# Patient Record
Sex: MALE | Race: WHITE
[De-identification: names, ages, dates, MRNs, and addresses within clinical notes are randomized per-mention and may not be internally consistent; named-entity substitution may affect disease eponyms.]

---

## 2017-01-12 ENCOUNTER — HOSPITAL ENCOUNTER (EMERGENCY)
Dept: HOSPITAL 47 - EC | Age: 52
Discharge: HOME | End: 2017-01-12
Payer: COMMERCIAL

## 2017-01-12 VITALS — TEMPERATURE: 98.4 F

## 2017-01-12 VITALS — RESPIRATION RATE: 18 BRPM

## 2017-01-12 VITALS — HEART RATE: 88 BPM | SYSTOLIC BLOOD PRESSURE: 153 MMHG | DIASTOLIC BLOOD PRESSURE: 97 MMHG

## 2017-01-12 DIAGNOSIS — Z87.891: ICD-10-CM

## 2017-01-12 DIAGNOSIS — Z82.49: ICD-10-CM

## 2017-01-12 DIAGNOSIS — R07.9: Primary | ICD-10-CM

## 2017-01-12 DIAGNOSIS — Z86.711: ICD-10-CM

## 2017-01-12 DIAGNOSIS — F41.9: ICD-10-CM

## 2017-01-12 DIAGNOSIS — Z79.01: ICD-10-CM

## 2017-01-12 DIAGNOSIS — Z79.899: ICD-10-CM

## 2017-01-12 LAB
ALP SERPL-CCNC: 69 U/L (ref 38–126)
ALT SERPL-CCNC: 101 U/L (ref 21–72)
ANION GAP SERPL CALC-SCNC: 16 MMOL/L
AST SERPL-CCNC: 43 U/L (ref 17–59)
BASOPHILS # BLD AUTO: 0.1 K/UL (ref 0–0.2)
BASOPHILS NFR BLD AUTO: 1 %
BUN SERPL-SCNC: 16 MG/DL (ref 9–20)
CALCIUM SPEC-MCNC: 9.6 MG/DL (ref 8.4–10.2)
CH: 31
CHCM: 34.9
CHLORIDE SERPL-SCNC: 106 MMOL/L (ref 98–107)
CO2 SERPL-SCNC: 22 MMOL/L (ref 22–30)
EOSINOPHIL # BLD AUTO: 0.1 K/UL (ref 0–0.7)
EOSINOPHIL NFR BLD AUTO: 2 %
ERYTHROCYTE [DISTWIDTH] IN BLOOD BY AUTOMATED COUNT: 5.29 M/UL (ref 4.3–5.9)
ERYTHROCYTE [DISTWIDTH] IN BLOOD: 12.9 % (ref 11.5–15.5)
GLUCOSE SERPL-MCNC: 170 MG/DL (ref 74–99)
HCT VFR BLD AUTO: 47.3 % (ref 39–53)
HDW: 2.67
HGB BLD-MCNC: 16 GM/DL (ref 13–17.5)
LUC NFR BLD AUTO: 2 %
LYMPHOCYTES # SPEC AUTO: 1.6 K/UL (ref 1–4.8)
LYMPHOCYTES NFR SPEC AUTO: 23 %
MCH RBC QN AUTO: 30.2 PG (ref 25–35)
MCHC RBC AUTO-ENTMCNC: 33.8 G/DL (ref 31–37)
MCV RBC AUTO: 89.3 FL (ref 80–100)
MONOCYTES # BLD AUTO: 0.5 K/UL (ref 0–1)
MONOCYTES NFR BLD AUTO: 7 %
NEUTROPHILS # BLD AUTO: 4.6 K/UL (ref 1.3–7.7)
NEUTROPHILS NFR BLD AUTO: 65 %
NON-AFRICAN AMERICAN GFR(MDRD): >60
POTASSIUM SERPL-SCNC: 4.1 MMOL/L (ref 3.5–5.1)
PROT SERPL-MCNC: 7.7 G/DL (ref 6.3–8.2)
SODIUM SERPL-SCNC: 144 MMOL/L (ref 137–145)
WBC # BLD AUTO: 0.14 10*3/UL
WBC # BLD AUTO: 7 K/UL (ref 3.8–10.6)
WBC (PEROX): 6.78

## 2017-01-12 PROCEDURE — 93005 ELECTROCARDIOGRAM TRACING: CPT

## 2017-01-12 PROCEDURE — 85025 COMPLETE CBC W/AUTO DIFF WBC: CPT

## 2017-01-12 PROCEDURE — 99285 EMERGENCY DEPT VISIT HI MDM: CPT

## 2017-01-12 PROCEDURE — 83880 ASSAY OF NATRIURETIC PEPTIDE: CPT

## 2017-01-12 PROCEDURE — 71010: CPT

## 2017-01-12 PROCEDURE — 36415 COLL VENOUS BLD VENIPUNCTURE: CPT

## 2017-01-12 PROCEDURE — 84484 ASSAY OF TROPONIN QUANT: CPT

## 2017-01-12 PROCEDURE — 85379 FIBRIN DEGRADATION QUANT: CPT

## 2017-01-12 PROCEDURE — 80053 COMPREHEN METABOLIC PANEL: CPT

## 2017-01-12 NOTE — XR
EXAMINATION TYPE: XR chest 1V portable

 

DATE OF EXAM: 1/12/2017 9:14 AM

 

Comparison: None

 

Clinical History: 51-year-old male with dyspnea and chest pain today

 

Findings:

 The cardiomediastinal silhouette, aorta, and pulmonary vasculature are within normal limits. Lung vo
lumes are slightly low with crowded vascular markings. No consolidation or pleural effusion. 

 

Impression:

Hypoventilatory changes without acute cardiopulmonary process.

## 2017-01-12 NOTE — ED
SOB HPI





- General


Chief Complaint: Shortness of Breath


Stated Complaint: RECENT Hx OF PE, SHOWING SYMPTOMS AGAIN, SOB


Time Seen by Provider: 01/12/17 08:37


Source: patient, RN notes reviewed


Limitations: no limitations





- History of Present Illness


Initial Comments: 





This patient is a 51-year-old man with a history of pulmonary embolus diagnosed 

in May 2016, who states that this morning when he was getting ready for work he 

noticed he was having some mild shortness of breath, as well as a tight feeling 

across his chest.  He states that after a minute he noticed that he was having 

a tingling feeling in his fingers and toes both sides.  He states that given 

the history of PE he decided he should be evaluated for this.  The patient 

states that his previous course did not reveal a cause for the pulmonary 

embolus.  His father did have history of DVT.  The patient does continue to 

take Xarelto as prescribed, and states he hasn't missed doses.  Patient denies 

prolonged travel or bed rest.  No leg pain or swelling.


MD Complaint: shortness of breath, chest pain


-: hour(s)


Severity: mild


Quality: dull


Consistency: constant


Improves With: nothing


Worsens With: nothing


Known History Of: other (PE)


Associated Symptoms: parasthesias





- Related Data


 Home Medications











 Medication  Instructions  Recorded  Confirmed


 


Gemfibrozil [Lopid] 600 mg PO BID 01/12/17 01/12/17


 


Metoprolol Tartrate [Lopressor] 12.5 mg PO BID 01/12/17 01/12/17








 Previous Rx's











 Medication  Instructions  Recorded


 


Rivaroxaban [Xarelto] 20 mg PO DAILY #30 tab 06/01/16











 Allergies











Allergy/AdvReac Type Severity Reaction Status Date / Time


 


No Known Allergies Allergy   Verified 01/12/17 09:07














Review of Systems


ROS Statement: 


Those systems with pertinent positive or pertinent negative responses have been 

documented in the HPI.





ROS Other: All systems not noted in ROS Statement are negative.


Constitutional: Denies: fever, chills


ENT: Denies: throat pain, congestion


Respiratory: Reports: as per HPI, dyspnea.  Denies: cough, wheezes, hemoptysis


Cardiovascular: Reports: as per HPI, chest pain.  Denies: palpitations, dyspnea 

on exertion, orthopnea, edema, syncope


Gastrointestinal: Denies: abdominal pain, nausea, vomiting


Genitourinary: Denies: dysuria, hematuria


Musculoskeletal: Denies: back pain


Skin: Denies: rash


Neurological: Denies: headache, weakness, numbness





Past Medical History


Past Medical History: No Reported History, Pneumonia


History of Any Multi-Drug Resistant Organisms: None Reported


Past Surgical History: No Surgical Hx Reported


Past Psychological History: No Psychological Hx Reported


Smoking Status: Never smoker


Past Alcohol Use History: Occasional


Past Drug Use History: None Reported


Additional Drug Use History / Comment(s): Patient quit tobacco use use over 10 

years ago occasionally he would smoke cigars patient denies any social drug use 

occasionally drinks alcoholic beverages there is no O2 requirements at home no 

nebulizer needs no CPAP needs patient lives with the parent and is single





- Past Family History


  ** Mother


Family Medical History: No Reported History, Coronary Artery Disease (CAD)


Additional Family Medical History / Comment(s): CABG in 70's, father healthy, 

one brother healthy no sisters no.daughter has no sons





General Exam


General appearance: alert, in no apparent distress


Head exam: Present: atraumatic, normocephalic


Eye exam: Present: normal appearance.  Absent: scleral icterus, conjunctival 

injection


ENT exam: Present: normal oropharynx


Neck exam: Present: normal inspection


Respiratory exam: Present: normal lung sounds bilaterally.  Absent: respiratory 

distress, wheezes, rales, rhonchi, stridor, chest wall tenderness, decreased 

breath sounds, prolonged expiratory


Cardiovascular Exam: Present: regular rate, normal rhythm, normal heart sounds.

  Absent: systolic murmur, diastolic murmur, rubs, gallop


GI/Abdominal exam: Present: soft.  Absent: distended, tenderness, guarding, 

rebound


Extremities exam: Present: normal inspection, normal capillary refill.  Absent: 

pedal edema, calf tenderness


Back exam: Present: normal inspection.  Absent: CVA tenderness (R), CVA 

tenderness (L)


Neurological exam: Present: alert


Skin exam: Present: warm, dry, intact, normal color.  Absent: rash, cyanosis, 

diaphoretic, erythema, petechiae, pallor, mottled





Course


 Vital Signs











  01/12/17 01/12/17





  08:30 10:30


 


Temperature 98.4 F 


 


Pulse Rate 89 87


 


Respiratory 22 18





Rate  


 


Blood Pressure 133/90 125/85


 


O2 Sat by Pulse 95 95





Oximetry  














Medical Decision Making





- Lab Data


Result diagrams: 


 01/12/17 08:35





 01/12/17 08:35


 Lab Results











  01/12/17 01/12/17 01/12/17 Range/Units





  08:35 08:35 08:35 


 


WBC  7.0    (3.8-10.6)  k/uL


 


RBC  5.29    (4.30-5.90)  m/uL


 


Hgb  16.0    (13.0-17.5)  gm/dL


 


Hct  47.3    (39.0-53.0)  %


 


MCV  89.3    (80.0-100.0)  fL


 


MCH  30.2    (25.0-35.0)  pg


 


MCHC  33.8    (31.0-37.0)  g/dL


 


RDW  12.9    (11.5-15.5)  %


 


Plt Count  304    (150-450)  k/uL


 


Neutrophils %  65    %


 


Lymphocytes %  23    %


 


Monocytes %  7    %


 


Eosinophils %  2    %


 


Basophils %  1    %


 


Neutrophils #  4.6    (1.3-7.7)  k/uL


 


Lymphocytes #  1.6    (1.0-4.8)  k/uL


 


Monocytes #  0.5    (0-1.0)  k/uL


 


Eosinophils #  0.1    (0-0.7)  k/uL


 


Basophils #  0.1    (0-0.2)  k/uL


 


D-Dimer    <0.17  (<0.60)  mg/L FEU


 


Sodium   144   (137-145)  mmol/L


 


Potassium   4.1   (3.5-5.1)  mmol/L


 


Chloride   106   ()  mmol/L


 


Carbon Dioxide   22   (22-30)  mmol/L


 


Anion Gap   16   mmol/L


 


BUN   16   (9-20)  mg/dL


 


Creatinine   1.00   (0.66-1.25)  mg/dL


 


Est GFR (MDRD) Af Amer   >60   (>60 ml/min/1.73 sqM)  


 


Est GFR (MDRD) Non-Af   >60   (>60 ml/min/1.73 sqM)  


 


Glucose   170 H   (74-99)  mg/dL


 


Calcium   9.6   (8.4-10.2)  mg/dL


 


Total Bilirubin   0.8   (0.2-1.3)  mg/dL


 


AST   43   (17-59)  U/L


 


ALT   101 H   (21-72)  U/L


 


Alkaline Phosphatase   69   ()  U/L


 


Troponin I     (0.000-0.034)  ng/mL


 


NT-Pro-B Natriuret Pep     pg/mL


 


Total Protein   7.7   (6.3-8.2)  g/dL


 


Albumin   4.7   (3.5-5.0)  g/dL














  01/12/17 01/12/17 Range/Units





  08:35 08:35 


 


WBC    (3.8-10.6)  k/uL


 


RBC    (4.30-5.90)  m/uL


 


Hgb    (13.0-17.5)  gm/dL


 


Hct    (39.0-53.0)  %


 


MCV    (80.0-100.0)  fL


 


MCH    (25.0-35.0)  pg


 


MCHC    (31.0-37.0)  g/dL


 


RDW    (11.5-15.5)  %


 


Plt Count    (150-450)  k/uL


 


Neutrophils %    %


 


Lymphocytes %    %


 


Monocytes %    %


 


Eosinophils %    %


 


Basophils %    %


 


Neutrophils #    (1.3-7.7)  k/uL


 


Lymphocytes #    (1.0-4.8)  k/uL


 


Monocytes #    (0-1.0)  k/uL


 


Eosinophils #    (0-0.7)  k/uL


 


Basophils #    (0-0.2)  k/uL


 


D-Dimer    (<0.60)  mg/L FEU


 


Sodium    (137-145)  mmol/L


 


Potassium    (3.5-5.1)  mmol/L


 


Chloride    ()  mmol/L


 


Carbon Dioxide    (22-30)  mmol/L


 


Anion Gap    mmol/L


 


BUN    (9-20)  mg/dL


 


Creatinine    (0.66-1.25)  mg/dL


 


Est GFR (MDRD) Af Amer    (>60 ml/min/1.73 sqM)  


 


Est GFR (MDRD) Non-Af    (>60 ml/min/1.73 sqM)  


 


Glucose    (74-99)  mg/dL


 


Calcium    (8.4-10.2)  mg/dL


 


Total Bilirubin    (0.2-1.3)  mg/dL


 


AST    (17-59)  U/L


 


ALT    (21-72)  U/L


 


Alkaline Phosphatase    ()  U/L


 


Troponin I   <0.012  (0.000-0.034)  ng/mL


 


NT-Pro-B Natriuret Pep  29   pg/mL


 


Total Protein    (6.3-8.2)  g/dL


 


Albumin    (3.5-5.0)  g/dL














- EKG Data


-: EKG Interpreted by Me


EKG shows normal: sinus rhythm, axis (Normal), intervals (Normal), QRS 

complexes (Normal), ST-T waves (Normal)


Rate: normal (Rate approximately 90 bpm)


Interpretation: normal EKG





Disposition


Clinical Impression: 


 Chest pain, Anxiety





Disposition: HOME SELF-CARE


Condition: Good


Instructions:  Chest Pain (ED)


Additional Instructions: 


As we discussed, Should any of your symptoms recur definitely return and the 

stress test can be arranged as an inpatient, otherwise follow-up to have the 

stress test arranged within the next week as an outpatient.


Referrals: 


Luis Felipe Bentley MD [Primary Care Provider] - 1-2 days

## 2017-02-07 ENCOUNTER — HOSPITAL ENCOUNTER (OUTPATIENT)
Dept: HOSPITAL 47 - RADNMMAIN | Age: 52
Discharge: HOME | End: 2017-02-07
Payer: COMMERCIAL

## 2017-02-07 DIAGNOSIS — R06.02: Primary | ICD-10-CM

## 2017-02-07 PROCEDURE — 93350 STRESS TTE ONLY: CPT

## 2017-02-07 PROCEDURE — 93017 CV STRESS TEST TRACING ONLY: CPT

## 2017-02-07 NOTE — ECHOS
DATE OF SERVICE:  02/07/2017



AGE:   51Y        SEX:  M        HT:  72"     WT:  250 lbs.           

Protocol Kobe: X  Others:  Stress Echo  Stage:  III  Dur. of 

Exercise: 7:01 



*Heart Rate         Blood Pressure                     

*Rest:  108                   Rest:  113/45                           

*                              

*Max. Achieved:     166  Maximum BP:  198/92 

       85% PMHR:  145                    

          100% PMHR:  171          



*METS:  8.5    





INDICATIONS:  Chest pain/shortness of breath. 



MEDICATIONS: Xarelto, lisinopril.



STRESS DATA: Pretesting physical examination showed heart rate of 108, 

pressure is 113/45 mmHg. Baseline EKG shows sinus mechanism. The 

patient exercised on Kobe protocol for a total of 7 minutes and 

achieved 8 of METs.  His max heart rate was 166, which is about 97% of 

maximum predicted heart rate. Maximum blood pressure was 198/92 mmHg. 

Clinically, the patient did not have any symptoms of chest pain or 

discomfort and the EKG did not show any significant ST or T-wave 

abnormalities consistent with ischemia. 



ECHOCARDIOGRAM IMAGES:  On echocardiogram images of parasternal long 

axis view, parasternal short axis view, apical 4 chamber and apical 2 

chamber view were the baseline images, at the peak of the heart rate, 

as well as on recovery and the echocardiogram images showed good 

augmentation in the left ventricular systolic function without any 

evidence of wall motion abnormalities consistent with ischemia.  



CONCLUSION: 

1. Good exercise capacity. 

2. Normal EKG in response to exercise.

3. Normal echocardiogram in response to exercise.

## 2017-07-17 ENCOUNTER — HOSPITAL ENCOUNTER (INPATIENT)
Dept: HOSPITAL 47 - EC | Age: 52
LOS: 1 days | Discharge: HOME | DRG: 309 | End: 2017-07-18
Payer: COMMERCIAL

## 2017-07-17 VITALS — RESPIRATION RATE: 18 BRPM

## 2017-07-17 DIAGNOSIS — K21.9: ICD-10-CM

## 2017-07-17 DIAGNOSIS — Z86.711: ICD-10-CM

## 2017-07-17 DIAGNOSIS — K76.0: ICD-10-CM

## 2017-07-17 DIAGNOSIS — Z79.01: ICD-10-CM

## 2017-07-17 DIAGNOSIS — I50.9: ICD-10-CM

## 2017-07-17 DIAGNOSIS — E78.1: ICD-10-CM

## 2017-07-17 DIAGNOSIS — I48.0: ICD-10-CM

## 2017-07-17 DIAGNOSIS — Z79.899: ICD-10-CM

## 2017-07-17 DIAGNOSIS — D68.9: ICD-10-CM

## 2017-07-17 DIAGNOSIS — R73.9: ICD-10-CM

## 2017-07-17 DIAGNOSIS — E78.5: ICD-10-CM

## 2017-07-17 DIAGNOSIS — K75.81: ICD-10-CM

## 2017-07-17 DIAGNOSIS — E66.9: ICD-10-CM

## 2017-07-17 DIAGNOSIS — I11.0: ICD-10-CM

## 2017-07-17 DIAGNOSIS — I47.1: Primary | ICD-10-CM

## 2017-07-17 LAB
ALP SERPL-CCNC: 99 U/L (ref 38–126)
ALT SERPL-CCNC: 1214 U/L (ref 21–72)
ANION GAP SERPL CALC-SCNC: 13 MMOL/L
APTT BLD: 27.8 SEC (ref 22–30)
AST SERPL-CCNC: 723 U/L (ref 17–59)
BASOPHILS # BLD AUTO: 0.1 K/UL (ref 0–0.2)
BASOPHILS NFR BLD AUTO: 1 %
BUN SERPL-SCNC: 24 MG/DL (ref 9–20)
CALCIUM SPEC-MCNC: 8.6 MG/DL (ref 8.4–10.2)
CH: 30.8
CHCM: 34
CHLORIDE SERPL-SCNC: 99 MMOL/L (ref 98–107)
CK SERPL-CCNC: 53 U/L (ref 55–170)
CK SERPL-CCNC: 54 U/L (ref 55–170)
CO2 SERPL-SCNC: 26 MMOL/L (ref 22–30)
EOSINOPHIL # BLD AUTO: 0.2 K/UL (ref 0–0.7)
EOSINOPHIL NFR BLD AUTO: 1 %
ERYTHROCYTE [DISTWIDTH] IN BLOOD BY AUTOMATED COUNT: 4.74 M/UL (ref 4.3–5.9)
ERYTHROCYTE [DISTWIDTH] IN BLOOD: 14.1 % (ref 11.5–15.5)
GLUCOSE SERPL-MCNC: 123 MG/DL (ref 74–99)
HCT VFR BLD AUTO: 43.2 % (ref 39–53)
HDW: 2.74
HGB BLD-MCNC: 14.2 GM/DL (ref 13–17.5)
INR PPP: 2.2 (ref ?–1.2)
LUC NFR BLD AUTO: 1 %
LYMPHOCYTES # SPEC AUTO: 2 K/UL (ref 1–4.8)
LYMPHOCYTES NFR SPEC AUTO: 14 %
MAGNESIUM SPEC-SCNC: 2.6 MG/DL (ref 1.6–2.3)
MCH RBC QN AUTO: 29.9 PG (ref 25–35)
MCHC RBC AUTO-ENTMCNC: 32.8 G/DL (ref 31–37)
MCV RBC AUTO: 91.2 FL (ref 80–100)
MONOCYTES # BLD AUTO: 0.8 K/UL (ref 0–1)
MONOCYTES NFR BLD AUTO: 5 %
NEUTROPHILS # BLD AUTO: 11.6 K/UL (ref 1.3–7.7)
NEUTROPHILS NFR BLD AUTO: 78 %
NON-AFRICAN AMERICAN GFR(MDRD): >60
PHOSPHATE SERPL-MCNC: 3.3 MG/DL (ref 2.5–4.5)
POTASSIUM SERPL-SCNC: 4.1 MMOL/L (ref 3.5–5.1)
PROT SERPL-MCNC: 6.4 G/DL (ref 6.3–8.2)
PT BLD: 21 SEC (ref 9–12)
SODIUM SERPL-SCNC: 138 MMOL/L (ref 137–145)
TROPONIN I SERPL-MCNC: 0.03 NG/ML (ref 0–0.03)
TROPONIN I SERPL-MCNC: <0.012 NG/ML (ref 0–0.03)
WBC # BLD AUTO: 0.17 10*3/UL
WBC # BLD AUTO: 14.8 K/UL (ref 3.8–10.6)
WBC (PEROX): 13.85

## 2017-07-17 PROCEDURE — 96361 HYDRATE IV INFUSION ADD-ON: CPT

## 2017-07-17 PROCEDURE — 83880 ASSAY OF NATRIURETIC PEPTIDE: CPT

## 2017-07-17 PROCEDURE — 99291 CRITICAL CARE FIRST HOUR: CPT

## 2017-07-17 PROCEDURE — 71275 CT ANGIOGRAPHY CHEST: CPT

## 2017-07-17 PROCEDURE — 96365 THER/PROPH/DIAG IV INF INIT: CPT

## 2017-07-17 PROCEDURE — 96375 TX/PRO/DX INJ NEW DRUG ADDON: CPT

## 2017-07-17 PROCEDURE — 83690 ASSAY OF LIPASE: CPT

## 2017-07-17 PROCEDURE — 85025 COMPLETE CBC W/AUTO DIFF WBC: CPT

## 2017-07-17 PROCEDURE — 74177 CT ABD & PELVIS W/CONTRAST: CPT

## 2017-07-17 PROCEDURE — 82550 ASSAY OF CK (CPK): CPT

## 2017-07-17 PROCEDURE — 85730 THROMBOPLASTIN TIME PARTIAL: CPT

## 2017-07-17 PROCEDURE — 96376 TX/PRO/DX INJ SAME DRUG ADON: CPT

## 2017-07-17 PROCEDURE — 84484 ASSAY OF TROPONIN QUANT: CPT

## 2017-07-17 PROCEDURE — 76705 ECHO EXAM OF ABDOMEN: CPT

## 2017-07-17 PROCEDURE — 82553 CREATINE MB FRACTION: CPT

## 2017-07-17 PROCEDURE — 83735 ASSAY OF MAGNESIUM: CPT

## 2017-07-17 PROCEDURE — 78226 HEPATOBILIARY SYSTEM IMAGING: CPT

## 2017-07-17 PROCEDURE — 80053 COMPREHEN METABOLIC PANEL: CPT

## 2017-07-17 PROCEDURE — 36415 COLL VENOUS BLD VENIPUNCTURE: CPT

## 2017-07-17 PROCEDURE — 80061 LIPID PANEL: CPT

## 2017-07-17 PROCEDURE — 84100 ASSAY OF PHOSPHORUS: CPT

## 2017-07-17 PROCEDURE — 71010: CPT

## 2017-07-17 PROCEDURE — 93005 ELECTROCARDIOGRAM TRACING: CPT

## 2017-07-17 PROCEDURE — 85610 PROTHROMBIN TIME: CPT

## 2017-07-17 RX ADMIN — CEFAZOLIN SCH MLS/HR: 330 INJECTION, POWDER, FOR SOLUTION INTRAMUSCULAR; INTRAVENOUS at 18:28

## 2017-07-17 RX ADMIN — METOPROLOL TARTRATE SCH MG: 25 TABLET, FILM COATED ORAL at 22:18

## 2017-07-17 NOTE — US
EXAMINATION TYPE: US gallbladder

 

DATE OF EXAM: 7/17/2017

 

COMPARISON: NONE

 

CLINICAL HISTORY: Pain. Elevated liver enzymes

 

EXAM MEASUREMENTS:

 

Liver Length:  22.0 cm   

Gallbladder Wall:  0.3 cm   

CBD:  0.5 cm

Right Kidney:  11.9 x 4.7 x 4.2 cm

 

*Technical limitations due to patient's body habitus and large amount of overlying bowel content

 

Pancreas:  Obscured by bowel gas

Liver:  enlarged, attenuating, unable to penetrate  

Gallbladder:  no evidence of stones

**Evidence for sonographic Roman's sign:  No

CBD:  wnl 

Right Kidney:  no evidence of hydronephrosis  

Small amount of free fluid RUQ adjacent to liver

 

 

IMPRESSION: 

1. Hepatomegaly with underlying fatty hepatic infiltration.

2. Small amount of free fluid.

## 2017-07-17 NOTE — ED
General Adult HPI





- General


Chief complaint: Chest Pain


Stated complaint: abnormal ekg


Time Seen by Provider: 07/17/17 12:00


Source: patient, RN notes reviewed, old records reviewed


Mode of arrival: wheelchair


Limitations: no limitations





- History of Present Illness


Initial comments: 





This is a 51-year-old male to the ER for evaluation.  Patient presented here 

for evaluation of elevated heart rate.  Patient has history of SVT history of 

atrial fibrillation, patient's on Coumadin.  Continue all medications as 

prescribed, denies drugs or all call.  Patient was sent in from Dr. Bentley's 

office for evaluation regarding severely elevated heart rate, patient is an 

 syncope though at this time he denies any chest pain, no significant 

shortness of breath.  Patient's resting comfortable he.  He states any to get 

up and move around he was severely short of breath





- Related Data


 Home Medications











 Medication  Instructions  Recorded  Confirmed


 


Gemfibrozil [Lopid] 600 mg PO AC-BID 01/12/17 07/17/17


 


Metoprolol Tartrate [Lopressor] 12.5 mg PO BID 01/12/17 07/17/17








 Previous Rx's











 Medication  Instructions  Recorded


 


Rivaroxaban [Xarelto] 20 mg PO DAILY #30 tab 06/01/16











 Allergies











Allergy/AdvReac Type Severity Reaction Status Date / Time


 


No Known Allergies Allergy   Verified 07/17/17 13:18














Review of Systems


ROS Statement: 


Those systems with pertinent positive or pertinent negative responses have been 

documented in the HPI.





ROS Other: All systems not noted in ROS Statement are negative.





Past Medical History


Past Medical History: Pneumonia


Additional Past Medical History / Comment(s): ventricular tachycardia


History of Any Multi-Drug Resistant Organisms: None Reported


Past Surgical History: Heart Catheterization


Additional Past Surgical History / Comment(s): heart ablation


Past Psychological History: No Psychological Hx Reported


Smoking Status: Never smoker


Past Alcohol Use History: Occasional


Past Drug Use History: None Reported





- Past Family History


  ** Mother


Family Medical History: No Reported History, Coronary Artery Disease (CAD)


Additional Family Medical History / Comment(s): CABG in 70's, father healthy, 

one brother healthy no sisters no.daughter has no sons





General Exam


Limitations: no limitations


General appearance: alert, anxious, in distress


Head exam: Present: atraumatic, normocephalic, normal inspection


Eye exam: Present: normal appearance, PERRL, EOMI.  Absent: scleral icterus, 

conjunctival injection, periorbital swelling


ENT exam: Present: normal exam, mucous membranes moist


Neck exam: Present: normal inspection.  Absent: tenderness, meningismus, 

lymphadenopathy


Respiratory exam: Present: normal lung sounds bilaterally.  Absent: respiratory 

distress, wheezes, rales, rhonchi, stridor


Cardiovascular Exam: Present: tachycardia, normal heart sounds.  Absent: 

systolic murmur, diastolic murmur, rubs, gallop, clicks


GI/Abdominal exam: Present: soft, normal bowel sounds.  Absent: distended, 

tenderness, guarding, rebound, rigid


Extremities exam: Present: normal inspection, full ROM, normal capillary 

refill.  Absent: tenderness, pedal edema, joint swelling, calf tenderness


Back exam: Present: normal inspection


Neurological exam: Present: alert, oriented X3, CN II-XII intact


Psychiatric exam: Present: normal affect, normal mood


Skin exam: Present: warm, dry, intact, normal color.  Absent: rash





Course


 Vital Signs











  07/17/17 07/17/17 07/17/17





  11:53 12:25 12:27


 


Temperature 99.2 F  


 


Pulse Rate 170 H 187 H 


 


Pulse Rate [   187 H





Cardiac Monitor   





]   


 


Respiratory 20 18 





Rate   


 


Blood Pressure 136/84 112/84 


 


O2 Sat by Pulse 99 96 





Oximetry   














  07/17/17





  13:05


 


Temperature 


 


Pulse Rate 105 H


 


Pulse Rate [ 





Cardiac Monitor 





] 


 


Respiratory 20





Rate 


 


Blood Pressure 126/95


 


O2 Sat by Pulse 95





Oximetry 














- Reevaluation(s)


Reevaluation #1: 





07/17/17 14:42


Patient originally started try on Cardizem bolus with Cardizem drip with no 

success





Decision was made to use adenosine 12 mg with good cardioversion to sinus 

tachycardia





EKG Findings





- EKG Comments:


EKG Findings:: EKG shows sinus tachycardia rate 102, , QRS 90, 





Medical Decision Making





- Medical Decision Making





 51 mallei are without CT history of SVT, heart rate in the 200s, patient was 

cardioverted into sinus tachycardia now regular sinus rate, also with elevation 

of liver enzymes, will likely could be hepatitis.  No fevers. no signs of 

infection.  No PE





- Lab Data


Result diagrams: 


 07/17/17 12:15





 07/17/17 12:15


 Lab Results











  07/17/17 07/17/17 07/17/17 Range/Units





  12:15 12:15 12:15 


 


WBC   14.8 H   (3.8-10.6)  k/uL


 


RBC   4.74   (4.30-5.90)  m/uL


 


Hgb   14.2   (13.0-17.5)  gm/dL


 


Hct   43.2   (39.0-53.0)  %


 


MCV   91.2   (80.0-100.0)  fL


 


MCH   29.9   (25.0-35.0)  pg


 


MCHC   32.8   (31.0-37.0)  g/dL


 


RDW   14.1   (11.5-15.5)  %


 


Plt Count   352   (150-450)  k/uL


 


Neutrophils %   78   %


 


Lymphocytes %   14   %


 


Monocytes %   5   %


 


Eosinophils %   1   %


 


Basophils %   1   %


 


Neutrophils #   11.6 H   (1.3-7.7)  k/uL


 


Lymphocytes #   2.0   (1.0-4.8)  k/uL


 


Monocytes #   0.8   (0-1.0)  k/uL


 


Eosinophils #   0.2   (0-0.7)  k/uL


 


Basophils #   0.1   (0-0.2)  k/uL


 


PT     (9.0-12.0)  sec


 


INR     (<1.2)  


 


APTT     (22.0-30.0)  sec


 


Sodium    138  (137-145)  mmol/L


 


Potassium    4.1  (3.5-5.1)  mmol/L


 


Chloride    99  ()  mmol/L


 


Carbon Dioxide    26  (22-30)  mmol/L


 


Anion Gap    13  mmol/L


 


BUN    24 H  (9-20)  mg/dL


 


Creatinine    1.09  (0.66-1.25)  mg/dL


 


Est GFR (MDRD) Af Amer    >60  (>60 ml/min/1.73 sqM)  


 


Est GFR (MDRD) Non-Af    >60  (>60 ml/min/1.73 sqM)  


 


Glucose    123 H  (74-99)  mg/dL


 


Calcium    8.6  (8.4-10.2)  mg/dL


 


Phosphorus    3.3  (2.5-4.5)  mg/dL


 


Magnesium    2.6 H  (1.6-2.3)  mg/dL


 


Total Bilirubin    4.3 H  (0.2-1.3)  mg/dL


 


AST    723 H  (17-59)  U/L


 


ALT    1214 H  (21-72)  U/L


 


Alkaline Phosphatase    99  ()  U/L


 


Total Creatine Kinase  54 L    ()  U/L


 


CK-MB (CK-2)  0.9    (0.0-2.4)  ng/mL


 


CK-MB (CK-2) Rel Index  1.7    


 


Troponin I  <0.012    (0.000-0.034)  ng/mL


 


NT-Pro-B Natriuret Pep     pg/mL


 


Total Protein    6.4  (6.3-8.2)  g/dL


 


Albumin    3.7  (3.5-5.0)  g/dL














  07/17/17 07/17/17 Range/Units





  12:15 12:15 


 


WBC    (3.8-10.6)  k/uL


 


RBC    (4.30-5.90)  m/uL


 


Hgb    (13.0-17.5)  gm/dL


 


Hct    (39.0-53.0)  %


 


MCV    (80.0-100.0)  fL


 


MCH    (25.0-35.0)  pg


 


MCHC    (31.0-37.0)  g/dL


 


RDW    (11.5-15.5)  %


 


Plt Count    (150-450)  k/uL


 


Neutrophils %    %


 


Lymphocytes %    %


 


Monocytes %    %


 


Eosinophils %    %


 


Basophils %    %


 


Neutrophils #    (1.3-7.7)  k/uL


 


Lymphocytes #    (1.0-4.8)  k/uL


 


Monocytes #    (0-1.0)  k/uL


 


Eosinophils #    (0-0.7)  k/uL


 


Basophils #    (0-0.2)  k/uL


 


PT   21.0 H  (9.0-12.0)  sec


 


INR   2.2 H  (<1.2)  


 


APTT   27.8  (22.0-30.0)  sec


 


Sodium    (137-145)  mmol/L


 


Potassium    (3.5-5.1)  mmol/L


 


Chloride    ()  mmol/L


 


Carbon Dioxide    (22-30)  mmol/L


 


Anion Gap    mmol/L


 


BUN    (9-20)  mg/dL


 


Creatinine    (0.66-1.25)  mg/dL


 


Est GFR (MDRD) Af Amer    (>60 ml/min/1.73 sqM)  


 


Est GFR (MDRD) Non-Af    (>60 ml/min/1.73 sqM)  


 


Glucose    (74-99)  mg/dL


 


Calcium    (8.4-10.2)  mg/dL


 


Phosphorus    (2.5-4.5)  mg/dL


 


Magnesium    (1.6-2.3)  mg/dL


 


Total Bilirubin    (0.2-1.3)  mg/dL


 


AST    (17-59)  U/L


 


ALT    (21-72)  U/L


 


Alkaline Phosphatase    ()  U/L


 


Total Creatine Kinase    ()  U/L


 


CK-MB (CK-2)    (0.0-2.4)  ng/mL


 


CK-MB (CK-2) Rel Index    


 


Troponin I    (0.000-0.034)  ng/mL


 


NT-Pro-B Natriuret Pep  6000   pg/mL


 


Total Protein    (6.3-8.2)  g/dL


 


Albumin    (3.5-5.0)  g/dL














- Radiology Data


Radiology results: report reviewed (Chest x-ray is negative for acute disease, 

CTA chest and pelvis negative for acute disease PENDING FOR THE MORNING), image 

reviewed





Critical Care Time


Critical Care Time: Yes


Total Critical Care Time: 31





Disposition


Clinical Impression: 


 SVT (supraventricular tachycardia), Premature ventricular contraction





Disposition: ADMITTED AS IP TO THIS [facility]


Condition: Serious


Referrals: 


Luis Felipe Bentley MD [Primary Care Provider] - 1-2 days

## 2017-07-17 NOTE — XR
EXAMINATION TYPE: XR chest 1V portable

 

DATE OF EXAM: 7/17/2017

 

COMPARISON: 1/12/2017

 

HISTORY: Chest pain

 

TECHNIQUE: Single frontal view of the chest is obtained.

 

FINDINGS:  

There is no focal air space opacity, pleural effusion, or pneumothorax seen.  

The heart is enlarged although this may be technical in nature.

The osseous structures are intact.

 

IMPRESSION:  

1. Cardiomegaly may be technical in nature.

## 2017-07-17 NOTE — CT
EXAMINATION TYPE: CT angio chest

 

DATE OF EXAM: 7/17/2017

 

COMPARISON: CTA chest May 30, 2016. Chest x-ray earlier today.

 

HISTORY: abn EKG, rapid heart rate, fever, and chest pain.

 

CT DLP: 801.5 mGycm. Automated Exposure Control for Dose Reduction was Utilized.

 

 

CONTRAST: 

CTA scan of the thorax is performed with IV Contrast, patient injected with 100 mL of Omnipaque 350, 
pulmonary embolism protocol.  MIP Images are created on CT scanner and reviewed.

 

FINDINGS:

 

LUNGS: Seen better on CT than recent chest x-ray there are multifocal areas of groundglass opacity se
en bilaterally centrally in both lungs involving upper and lower lungs more prominent on the left angelina
g versus right lung with fairly equal involvement of the left upper and lower lobes. There is tiny ri
ght pleural effusion. There is small left pleural effusion. There is associated compressive atelectas
is in the left lung base. No pneumothorax is seen bilaterally. Slightly elevated right hemidiaphragm 
is redemonstrated.

 

MEDIASTINUM: There is satisfactory somewhat suboptimal bolus but there is no CT evidence for pulmonar
y embolism.  There are no greater than 1 cm hilar or mediastinal lymph nodes. There are some prominen
t but subcentimeter lymph nodes scattered throughout the thorax.  No cardiomegaly or pericardial effu
manoj is seen. 

 

OTHER: Liver remains markedly hypodense consistent with fatty infiltration.

 

IMPRESSION: 

1. Suboptimal bolus without CT evidence for pulmonary embolism.

2. Bilateral multifocal central ground glass opacities suggests edema with small effusions, finding s
lightly more prominent in left lung versus right lung. Consider fluid overload state. Underlying infe
ctious process cannot be excluded particularly due to asymmetry of findings in the left lung.

## 2017-07-17 NOTE — P.CNPUL
History of Present Illness


Consult date: 07/17/17


Reason for consult: dyspnea


History of present illness: 





51-year-old obese male patient with a previous history of pulmonary embolism 

that occurred in May 2016 and the patient has been maintained on long-term and 

to coagulation with Xarelto.  The patient was getting progressively more short 

of breath over the past 1 week or so.  He has noted to have some orthopnea 

where he was unable to lay down flat.  Earlier today, the patient also felt 

that his heart rate was going very fast.  He end up going to his primary care 

physician's office where he was found to be in SVT with a heart rate of 208.  

He was immediately referred to the emergency department at [facility].  In the ED, the patient was given 6 mg of IV adenosine which 

converted him back to normal sinus rhythm.  He was started on a Cardizem drip 

he had noted he was already anticoagulated with Xarelto.  The patient also had 

a CT angios the chest.  The contest itself was suboptimal however there was no 

obvious pulmonary embolism identified.  There was some scattered areas of 

groundglass changes bilaterally and a small left-sided pleural effusion and I 

think these are representation's of CHF in the setting of SVT and the rapid 

ventricular response.  The patient is already feeling better at this point.  No 

cough.  No sputum production.  No pleurisy.  No hemoptysis.  No recent 

pneumonias.  No aspiration.  Liver function tests are abnormal and the patient 

is being worked up for nonalcoholic steatohepatitis/HOOKER.  The patient had a 

gallbladder ultrasound that showed fatty liver.  CAT scan of the abdomen and 

pelvis was also nonspecific.  He has no specific complaints for now.  

Echocardiogram that was done last year showed a preserved LV function.  BNP 

level was elevated in the 5000 range.





Review of Systems


All systems: negative


Constitutional: Denies chills, Denies fever


Eyes: denies blurred vision, denies pain


Ears, nose, mouth and throat: Denies headache, Denies sore throat


Cardiovascular: Denies chest pain, Denies shortness of breath


Respiratory: Denies cough


Gastrointestinal: Denies abdominal pain, Denies diarrhea, Denies nausea, Denies 

vomiting


Musculoskeletal: Denies myalgias


Integumentary: Denies pruritus, Denies rash


Neurological: Denies numbness, Denies weakness


Psychiatric: Denies anxiety, Denies depression


Endocrine: Denies fatigue, Denies weight change





Past Medical History


Past Medical History: Atrial Fibrillation, Pneumonia, Pulmonary Embolus (PE)


Additional Past Medical History / Comment(s): Obesity, pulmonary embolism, 

hyperlipidemia, hypertriglyceridemia, fatty liver, acid reflux


History of Any Multi-Drug Resistant Organisms: None Reported


Past Surgical History: Heart Catheterization


Past Anesthesia/Blood Transfusion Reactions: No Reported Reaction


Smoking Status: Never smoker





- Past Family History


  ** Mother


Family Medical History: No Reported History, Coronary Artery Disease (CAD)


Additional Family Medical History / Comment(s): CABG in 70's,  one brother 

healthy no sisters no.daughter has no sons





  ** Father


Family Medical History: No Reported History





Medications and Allergies


 Home Medications











 Medication  Instructions  Recorded  Confirmed  Type


 


Gemfibrozil [Lopid] 600 mg PO AC-BID 01/12/17 07/17/17 History


 


Metoprolol Tartrate [Lopressor] 12.5 mg PO BID 01/12/17 07/17/17 History











 Allergies











Allergy/AdvReac Type Severity Reaction Status Date / Time


 


No Known Allergies Allergy   Verified 07/17/17 13:18














Physical Exam


Vitals: 


 Vital Signs











  Temp Pulse Pulse Resp BP BP Pulse Ox


 


 07/17/17 18:02  98.0 F   87  18   114/68  93 L


 


 07/17/17 15:00  97.8 F  93   18  113/83   98


 


 07/17/17 13:05   105 H   20  126/95   95


 


 07/17/17 12:27    187 H    


 


 07/17/17 12:25   187 H   18  112/84   96


 


 07/17/17 11:53  99.2 F  170 H   20  136/84   99








 Intake and Output











 07/17/17 07/17/17 07/17/17





 06:59 14:59 22:59


 


Intake Total   480


 


Balance   480


 


Intake:   


 


  Oral   480


 


Other:   


 


  Weight  117.934 kg 117.934 kg








 Patient Weight











 07/18/17





 06:59


 


Weight 117.934 kg














Head exam was generally normal. There was no scleral icterus or corneal arcus. 

Mucous membranes were moist.  Neck is short and supple and there is significant 

crowding of the posterior oropharynx.  There is no goiter or neck masses.  

Lungs sounds are diminished in lung bases bilaterally otherwise clear.Cardiac 

exam revealed the PMI to be normally situated and sized. The rhythm was regular 

and no extrasystoles were noted during several minutes of auscultation. The 

first and second heart sounds were normal and physiologic splitting of the 

second heart sound was noted. There were no murmurs, rubs, clicks, or gallops.  

Abdomen is soft nontender.  There is no organomegaly.  No direct tenderness.  

No rebound tenderness.  No guarding.  Extremities show trace edema and there is 

no cyanosis or clubbing.





Results





- Laboratory Findings


CBC and BMP: 


 07/17/17 12:15





 07/17/17 12:15


PT/INR, D-dimer











PT  21.0 sec (9.0-12.0)  H  07/17/17  12:15    


 


INR  2.2  (<1.2)  H  07/17/17  12:15    








Abnormal lab findings: 


 Abnormal Labs











  07/17/17 07/17/17 07/17/17





  12:15 12:15 12:15


 


WBC   14.8 H 


 


Neutrophils #   11.6 H 


 


PT   


 


INR   


 


BUN    24 H


 


Glucose    123 H


 


Magnesium    2.6 H


 


Total Bilirubin    4.3 H


 


AST    723 H


 


ALT    1214 H


 


Total Creatine Kinase  54 L  














  07/17/17 07/17/17





  12:15 17:56


 


WBC  


 


Neutrophils #  


 


PT  21.0 H 


 


INR  2.2 H 


 


BUN  


 


Glucose  


 


Magnesium  


 


Total Bilirubin  


 


AST  


 


ALT  


 


Total Creatine Kinase   53 L














- Diagnostic Findings


Chest x-ray: image reviewed


CT scan - chest: image reviewed





Assessment and Plan


Plan: 





Assessment





1 acute dyspnea with presence of bilateral groundglass pulmonary infiltrates 

and a small left-sided pleural effusion.  These are most likely manifestations 

of CHF in the setting of SVT and rapid ventricular response.  Pneumonia is 

doubtful.  Malignancy is doubtful.





2 SVT, converted to sinus after a dose of adenosine.  Currently on beta 

blockers.  Fully anticoagulated.





3 pulmonary embolism, history of.  On articulation with Xarelto.  CT angios the 

chest does not show a recurrent event





4 abnormal LFTs, rule out non-alcoholic steatohepatitis/fatty liver.  Workup is 

in progress.





5 obesity





6 hyperlipidemia





7 hypertension





8 acid reflux





PLAN





I reviewed the CAT scan findings.  There is no concern for pneumonia this 

point.  As such there is no need for antibiotics.  I think the groundglass 

changes are manifestations of CHF in the setting of SVT with rapid ventricular 

response.  Clinically improved as the patient went back to normal sinus rhythm.

  Continue the Cardizem drip for another 24 hours.  Continue metoprolol.  Give 

the patient 40 mg of IV Lasix.  Continue anticoagulation with Xarelto.  

Complete the workup for abnormal LFTs.  Cardiology consultation.  We'll 

continue to follow.

## 2017-07-17 NOTE — CT
EXAMINATION TYPE: CT abdomen pelvis w con

 

DATE OF EXAM: 7/17/2017

 

COMPARISON: CT abdomen and pelvis October 11, 2016

 

HISTORY: abn EKG, rapid heart rate, fever, and pain.

 

CT DLP: 3818.4 mGycm, Automated Exposure Control for Dose Reduction was Utilized.

 

CONTRAST: 

CT scan of the abdomen and pelvis is performed without oral but with IV Contrast, patient injected wi
th 100 mL of Omnipaque 350.

 

FINDINGS:

 

LUNG BASES: Please refer to same day CT chest report for complete details on basis..

 

LIVER/GB: Liver is diffusely low dense consistent with fatty infiltration.

 

PANCREAS:  No significant abnormality is seen.

 

SPLEEN:  No significant abnormality is seen.

 

ADRENALS: There is stable nonspecific 2.4 x 1.8 cm left adrenal mass with Hounsfield units near 12, n
ot significantly changed in size or appearance from prior study.

 

KIDNEYS: Vague subcentimeter low dense areas laterally mid to lower pole level right kidney are too s
mall to further characterize but stable and thus presumed benign. There is branching of right renal a
rtery shortly after its origin seen best on coronal image 66 incidentally noted.

 

BOWEL: Evaluation bowel is suboptimal secondary to lack of enteric contrast. There is no suspicious s
mall or large bowel dilatation.

 

PROSTATE/SEMINAL VESICLES:  No gross abnormality seen.

 

LYMPH NODES:  No greater than 1cm abdominal or pelvic lymph nodes are appreciated.

 

OSSEOUS STRUCTURES:  No significant abnormality is seen.

 

OTHER:  No significant additional abnormality is seen.

 

IMPRESSION:  No significant new or acute finding is seen to account for patient's clinical symptoms.

## 2017-07-17 NOTE — P.HPIM
History of Present Illness


H&P Date: 07/17/17


Chief Complaint: Tachycardia, shortness of breath, history of PE, 

anticoagulation, history o





51-year-old  obese male one of my office patient who had previous 

history of PE of the right lower lobe also had an elevated troponin at the time 

secondary to acute pulmonary embolism.  Patient is known to have history of 

elevated blood pressure mild tachycardia and hyperlipidemia found to have 

mildly elevated abnormal liver function tests in the past was diagnosed as Craft 

patient has been seen in our office for the last 2 month and doing well.  He 

presented to the office today because of increased shortness of breath over the 

last few days with worsening palpitation and arrhythmia.  EKG in the office 

shows SVT with pulse running 200 bpm.  Patient was sent to [facility] department 

[facility] where was seen and evaluated was in SVT with pulse rate 

running in the very high 100 was giving 1 dose of adenosine 6 mg converted him 

down continue to tactile up little bed in the mid 100.  Patient was started on 

Cardizem drip initially is already on anticoagulation his Xarelto will be 

resume patient was hospitalized shortly after with the above problem. 

surprisingly with his lab work found to have significantly elevated liver 

function test with no explanation no acute liver injury.  Ultrasound of the 

liver showed mildly fatty liver to confirm Craft hepatitis panel still pending 

patient will go for HIDA scan to confirm any other possibility with the 

gallbladder as a nonfunctioning gallbladder.  Patient otherwise doing well 

after his admission.





Review of Systems


Constitutional: Reports malaise, Reports weight gain, Denies as per HPI, Denies 

anorexia, Denies chills, Denies chronic headaches, Denies chronic pain, Denies 

daytime sleepiness, Denies fatigue, Denies fever, Denies lethargy, Denies night 

sweats, Denies poor appetite, Denies sweats, Denies weakness, Denies weight loss


Eyes: bilateral as per HPI


Ears, nose, mouth and throat: Reports sinus pressure, Denies as per HPI, Denies 

ant. neck pain, Denies bleeding gums, Denies dental pain, Denies dysphagia, 

Denies epistaxis, Denies headache, Denies hoarseness, Denies mouth pain, Denies 

nasal congestion, Denies nasal discharge, Denies neck fullness/pressure, Denies 

neck lump, Denies nose pain, Denies odynophagia, Denies post-nasal drip, Denies 

sinus pain, Denies swelling in mouth, Denies swelling in throat, Denies sore 

throat, Denies vertigo, Denies voice changes


Cardiovascular: Reports chest pain, Reports dyspnea on exertion, Reports edema, 

Reports high blood pressure, Reports irregular heart beat, Reports paroxysmal 

nocturnal dyspnea, Reports rapid heart beat, Reports shortness of breath, 

Reports syncope, Denies as per HPI, Denies claudication, Denies decreased 

exercise tolerance, Denies leg edema, Denies lightheadedness, Denies orthopnea, 

Denies palpitations, Denies phlebitis


Respiratory: Reports congestion, Reports cough, Reports cough with sputum, 

Reports dyspnea, Denies as per HPI, Denies excessive sputum, Denies hemoptysis, 

Denies home oxygen, Denies pain, Denies pain on inspiration, Denies pleurisy, 

Denies respiratory infections, Denies sleep apnea, Denies snoring, Denies 

wheezing


Gastrointestinal: Reports bloating, Reports dyspepsia, Reports early satiety, 

Reports indigestion, Reports nausea


Genitourinary: Reports nocturia, Reports polyuria, Denies as per HPI, Denies 

decreased libido, Denies difficulties fathering child, Denies discharge, Denies 

dysuria, Denies erectile dysfunction, Denies flank pain, Denies genital pain, 

Denies genital sores, Denies hematuria, Denies impotence, Denies incontinence, 

Denies kidney stones, Denies testicular lump, Denies testicular pain, Denies 

urinary frequency, Denies urinary hesitancy, Denies urinary retention


Musculoskeletal: Reports loss of height, Reports low back pain, Denies as per 

HPI, Denies arm numbness/tingling, Denies atrophy, Denies fractures, Denies 

frequent falls, Denies gait dysfunction, Denies hot joints, Denies leg numbness/

tingling, Denies limitation of motion, Denies morning stiffness, Denies muscle 

cramps, Denies muscle weakness, Denies myalgias, Denies neck pain, Denies neck 

stiffness, Denies prior amputations, Denies redness of joints, Denies shooting 

arm pain, Denies shooting leg pain


Musculoskeletal: bilateral: ankle pain


Integumentary: Denies as per HPI, Denies acne, Denies boils, Denies brittle 

nails, Denies change in hair/nails, Denies color changes, Denies darkening of 

skin, Denies depigmentation, Denies dryness, Denies foot/leg ulcers, Denies 

growths, Denies hirsutism, Denies lesions, Denies onychomycosis, Denies pruritus

, Denies rash, Denies sores, Denies striae, Denies unusual bruising, Denies 

wounds


Neurological: Denies as per HPI, Denies aphasia, Denies ataxia, Denies balance 

difficulties, Denies burning pain, Denies change in mentation, Denies change in 

smell/taste, Denies change in speech, Denies confusion, Denies convulsions, 

Denies double vision, Denies gait dysfunction, Denies head injury, Denies 

headaches, Denies hearing difficulties, Denies lack of coordination, Denies 

loss of vision, Denies memory loss, Denies migraines, Denies motor disturbance, 

Denies numbness, Denies paralysis, Denies paresthesias, Denies seizures, Denies 

sensory deficit, Denies spasticity, Denies syncope, Denies tic, Denies tingling

, Denies transient paralysis, Denies tremors, Denies vertigo, Denies weakness, 

Denies visual changes


Psychiatric: Reports anxiety, Denies as per HPI, Denies anhedonia, Denies 

anxiety attacks, Denies change in appetite, Denies change in libido, Denies 

change in sleep habits, Denies confusion, Denies depression, Denies difficulty 

concentrating, Denies disorientation, Denies hallucinations, Denies hopelessness

, Denies hypersomnia, Denies insomnia, Denies irritability, Denies memory loss, 

Denies mood swings, Denies paranoia, Denies sadness/tearfulness, Denies sleep 

disturbances, Denies suicidal ideation


Endocrine: Reports excessive thirst, Reports flushing, Reports heat intolerance

, Denies as per HPI, Denies cold intolerance, Denies deepening of the voice, 

Denies excessive sweating, Denies fatigue, Denies increase in ring/shoe/hat size

, Denies low blood sugars, Denies nocturia, Denies palpitations, Denies 

polydipsia, Denies polyphagia, Denies polyuria, Denies proptosis, Denies recent 

glucocorticoid use, Denies thyroid mass, Denies weight change


Hematologic/Lymphatic: Reports easy bleeding, Denies as per HPI, Denies easy 

bruising, Denies lymphadenopathy, Denies lymphedema, Denies thrombophilia


Allergic/Immunologic: Denies as per HPI, Denies allergic rhinitis, Denies 

anaphylaxis, Denies angioedema, Denies gluten intolerance, Denies persistent 

infections, Denies seasonal allergies, Denies urticaria, Denies wheezing





Past Medical History


Past Medical History: Atrial Fibrillation, Pneumonia, Pulmonary Embolus (PE)


Additional Past Medical History / Comment(s): ventricular tachycardia


History of Any Multi-Drug Resistant Organisms: None Reported


Past Surgical History: Heart Catheterization


Additional Past Surgical History / Comment(s): heart ablation


Past Anesthesia/Blood Transfusion Reactions: No Reported Reaction


Smoking Status: Never smoker





- Past Family History


  ** Mother


Family Medical History: No Reported History, Coronary Artery Disease (CAD)


Additional Family Medical History / Comment(s): CABG in 70's,  one brother 

healthy no sisters no.daughter has no sons





  ** Father


Family Medical History: No Reported History





Medications and Allergies


 Home Medications











 Medication  Instructions  Recorded  Confirmed  Type


 


Gemfibrozil [Lopid] 600 mg PO AC-BID 01/12/17 07/17/17 History


 


Metoprolol Tartrate [Lopressor] 12.5 mg PO BID 01/12/17 07/17/17 History











 Allergies











Allergy/AdvReac Type Severity Reaction Status Date / Time


 


No Known Allergies Allergy   Verified 07/17/17 13:18














Physical Exam


Vitals: 


 Vital Signs











  Temp Pulse Pulse Resp BP Pulse Ox


 


 07/17/17 15:00  97.8 F  93   18  113/83  98


 


 07/17/17 13:05   105 H   20  126/95  95


 


 07/17/17 12:27    187 H   


 


 07/17/17 12:25   187 H   18  112/84  96


 


 07/17/17 11:53  99.2 F  170 H   20  136/84  99








 Intake and Output











 07/17/17 07/17/17 07/17/17





 06:59 14:59 22:59


 


Other:   


 


  Weight  117.934 kg 








 Patient Weight











 07/18/17





 06:59


 


Weight 117.934 kg














- Constitutional


General appearance: no average body habitus, cooperative, no disheveled, no 

mild distress, no morbidly obese, no acute distress, obese, no severe distress, 

no thin





- EENT


Eyes: no abnormal pupil, no anicteric sclerae, no disc margins sharp, no 

edentulous, no EOMI, no PERRLA, no fundus normal, no photophobia, no dentition 

normal, no poor dentition, no ptosis, no scleral icterus, normal appearance


ENT: no hard of hearing, no hearing grossly normal, no NA/AT, normal oropharynx

, no other, no pharyngeal erythema, no thrush, no tonsillar exudates, no 

tonsillar swelling


Ears: bilateral: normal





- Neck


Neck: no lymphadenopathy, normal ROM, no other, no rigidity, no stridor, no 

thyromegaly


Carotids: bilateral: upstroke normal


Thyroid: bilateral: normal size





- Respiratory


Respiratory: bilateral: CTA, diminished, dullness





- Cardiovascular


Rhythm: regular


Heart sounds: normal: S1, S2


Abnormal Heart Sounds: systolic murmur





- Gastrointestinal


General gastrointestinal: no absent bowel sounds, no decreased bowel sounds, no 

distended, no hepatomegaly, no hyperactive bowel sounds, normal bowel sounds, 

organomegaly, no rigid, no scaphoid, soft, no splenomegaly, no tenderness, no 

umbilical hernia, no ventral hernia





- Integumentary


Integumentary: no calor, no cellulitis, no cyanotic, no decreased turgor, no 

flushed, no jaundiced, normal, no normal turgor, no pale, rash, no ulcer





- Neurologic


Neurologic: CNII-XII intact





- Musculoskeletal


Musculoskeletal: gait normal, generalized weakness, strength equal bilaterally, 

no right sided weakness, no left sided weakness





- Psychiatric


Psychiatric: A&O x's 3, appropriate affect





Results


CBC & Chem 7: 


 07/17/17 12:15





 07/17/17 12:15


Labs: 


 Abnormal Lab Results - Last 24 Hours (Table)











  07/17/17 07/17/17 07/17/17 Range/Units





  12:15 12:15 12:15 


 


WBC   14.8 H   (3.8-10.6)  k/uL


 


Neutrophils #   11.6 H   (1.3-7.7)  k/uL


 


PT     (9.0-12.0)  sec


 


INR     (<1.2)  


 


BUN    24 H  (9-20)  mg/dL


 


Glucose    123 H  (74-99)  mg/dL


 


Magnesium    2.6 H  (1.6-2.3)  mg/dL


 


Total Bilirubin    4.3 H  (0.2-1.3)  mg/dL


 


AST    723 H  (17-59)  U/L


 


ALT    1214 H  (21-72)  U/L


 


Total Creatine Kinase  54 L    ()  U/L














  07/17/17 Range/Units





  12:15 


 


WBC   (3.8-10.6)  k/uL


 


Neutrophils #   (1.3-7.7)  k/uL


 


PT  21.0 H  (9.0-12.0)  sec


 


INR  2.2 H  (<1.2)  


 


BUN   (9-20)  mg/dL


 


Glucose   (74-99)  mg/dL


 


Magnesium   (1.6-2.3)  mg/dL


 


Total Bilirubin   (0.2-1.3)  mg/dL


 


AST   (17-59)  U/L


 


ALT   (21-72)  U/L


 


Total Creatine Kinase   ()  U/L














Thrombosis Risk Factor Assmnt





- DVT/VTE Prophylaxis


DVT/VTE Prophylaxis: Pharmacologic Prophylaxis ordered, Mechanical Prophylaxis 

ordered





Assessment and Plan


Plan: 





1 severe dyspnea and shortness of breath: Combination of arrhythmia and history 

of PE patient arrhythmia had subtle continue to watch his pulse ox and heart 

rate on monitor in the next 24 hours we will consult pulmonary and cardiology.





2 severe acute SVT: Patient was converted with adenosine continue Cardizem for 

short period of time and was off afterward his pulse rate is down patient might 

benefit from smaller dose of beta blocker like metoprolol 25 mg twice a day 

with titrated if needed.





3 severely abnormal liver function test: Not a clear etiology still try to 

exclude the possibility of gallbladder dyskinesia, HIDA scan will be done if 

this is negative this will be confirmed as Craft fatty liver and need further 

follow-up with gastroenterology.





4 abnormal chest x-ray ground glass opacification: No sign of pulmonary 

fibrosis so far will consult Dr. oRbledo who seen patient last admission for 

further management patient might benefit from repeating chest x-ray again in 

the next few weeks.





5 Elevated blood pressure: Patient will be on smaller dose of beta blocker 

titrate dose as needed.





6 severe GERD/GI prophylaxis: Patient will be on Pepcid 20 mg daily.





7 hyperglycemia: Has been on diet control patient was not confirmed diabetes so 

far.





8 history of PE: With testing this time negative for pulmonary embolism 

including CTA, patient has been on Xarelto which will be continued for now.  

Patient was diagnosed with familial coagulopathy continue anticoagulation.





9 CODE STATUS: Full code.





Expectation from this admission: Patient be in the hospital for 1-2 nights.

## 2017-07-18 VITALS — SYSTOLIC BLOOD PRESSURE: 121 MMHG | HEART RATE: 83 BPM | DIASTOLIC BLOOD PRESSURE: 70 MMHG | TEMPERATURE: 96.9 F

## 2017-07-18 LAB
ALP SERPL-CCNC: 105 U/L (ref 38–126)
ALT SERPL-CCNC: 719 U/L (ref 21–72)
ANION GAP SERPL CALC-SCNC: 11 MMOL/L
AST SERPL-CCNC: 252 U/L (ref 17–59)
BASOPHILS # BLD AUTO: 0 K/UL (ref 0–0.2)
BASOPHILS NFR BLD AUTO: 0 %
BUN SERPL-SCNC: 22 MG/DL (ref 9–20)
CALCIUM SPEC-MCNC: 7.6 MG/DL (ref 8.4–10.2)
CH: 30
CHCM: 32.8
CHLORIDE SERPL-SCNC: 106 MMOL/L (ref 98–107)
CHOLEST SERPL-MCNC: 103 MG/DL (ref ?–200)
CK SERPL-CCNC: 51 U/L (ref 55–170)
CO2 SERPL-SCNC: 22 MMOL/L (ref 22–30)
EOSINOPHIL # BLD AUTO: 0.4 K/UL (ref 0–0.7)
EOSINOPHIL NFR BLD AUTO: 4 %
ERYTHROCYTE [DISTWIDTH] IN BLOOD BY AUTOMATED COUNT: 3.93 M/UL (ref 4.3–5.9)
ERYTHROCYTE [DISTWIDTH] IN BLOOD: 14 % (ref 11.5–15.5)
GLUCOSE SERPL-MCNC: 85 MG/DL (ref 74–99)
HCT VFR BLD AUTO: 36.2 % (ref 39–53)
HDLC SERPL-MCNC: 15 MG/DL (ref 40–60)
HDW: 2.8
HGB BLD-MCNC: 11.8 GM/DL (ref 13–17.5)
LUC NFR BLD AUTO: 1 %
LYMPHOCYTES # SPEC AUTO: 1.7 K/UL (ref 1–4.8)
LYMPHOCYTES NFR SPEC AUTO: 16 %
MCH RBC QN AUTO: 30.1 PG (ref 25–35)
MCHC RBC AUTO-ENTMCNC: 32.7 G/DL (ref 31–37)
MCV RBC AUTO: 92.1 FL (ref 80–100)
MONOCYTES # BLD AUTO: 0.6 K/UL (ref 0–1)
MONOCYTES NFR BLD AUTO: 6 %
NEUTROPHILS # BLD AUTO: 7.6 K/UL (ref 1.3–7.7)
NEUTROPHILS NFR BLD AUTO: 73 %
NON-AFRICAN AMERICAN GFR(MDRD): >60
POTASSIUM SERPL-SCNC: 3.9 MMOL/L (ref 3.5–5.1)
PROT SERPL-MCNC: 5.4 G/DL (ref 6.3–8.2)
SODIUM SERPL-SCNC: 139 MMOL/L (ref 137–145)
TRIGL SERPL-MCNC: 157 MG/DL (ref ?–150)
TROPONIN I SERPL-MCNC: 0.03 NG/ML (ref 0–0.03)
WBC # BLD AUTO: 0.12 10*3/UL
WBC # BLD AUTO: 10.5 K/UL (ref 3.8–10.6)
WBC (PEROX): 10.66

## 2017-07-18 RX ADMIN — CEFAZOLIN SCH MLS/HR: 330 INJECTION, POWDER, FOR SOLUTION INTRAMUSCULAR; INTRAVENOUS at 00:45

## 2017-07-18 RX ADMIN — METOPROLOL TARTRATE SCH MG: 25 TABLET, FILM COATED ORAL at 10:45

## 2017-07-18 RX ADMIN — CEFAZOLIN SCH MLS/HR: 330 INJECTION, POWDER, FOR SOLUTION INTRAMUSCULAR; INTRAVENOUS at 10:50

## 2017-07-18 NOTE — NM
EXAMINATION TYPE: NM hepatobiliary w EF

 

DATE OF EXAM: 7/18/2017

 

COMPARISON: NONE

 

HISTORY: Abnormal liver function test.

 

TECHNIQUE: After the intravenous administration of 5 mCi Tc 99m Mebrofenin hepatobiliary scintigraphy
 is performed.  Immediate images post injection.

 

FINDINGS: 

There is satisfactory initial accumulation of tracer by the liver.  The gallbladder is visualized wit
hin 16 minutes.  The small bowel activity is noted within 12 minutes.  At one hour 8 ounces of oral e
nsure plus is given to mimic CCK and gallbladder ejection fraction is calculated at 35 %, in the norm
al range.  Therefore there is no scintigraphic evidence of cystic or common bile duct obstruction to 
suggest acute cholecystitis or gallbladder dyskinesia. 

 

IMPRESSION: Exam is within normal limits.

## 2017-07-18 NOTE — P.PN
Subjective








51-year-old obese male patient with a previous history of pulmonary embolism 

that occurred in May 2016 and the patient has been maintained on long-term and 

to coagulation with Xarelto.  The patient was getting progressively more short 

of breath over the past 1 week or so.  He has noted to have some orthopnea 

where he was unable to lay down flat.  Earlier today, the patient also felt 

that his heart rate was going very fast.  He end up going to his primary care 

physician's office where he was found to be in SVT with a heart rate of 208.  

He was immediately referred to the emergency department at [facility].  In the ED, the patient was given 6 mg of IV adenosine which 

converted him back to normal sinus rhythm.  He was started on a Cardizem drip 

he had noted he was already anticoagulated with Xarelto.  The patient also had 

a CT angios the chest.  The contest itself was suboptimal however there was no 

obvious pulmonary embolism identified.  There was some scattered areas of 

groundglass changes bilaterally and a small left-sided pleural effusion and I 

think these are representation's of CHF in the setting of SVT and the rapid 

ventricular response.  The patient is already feeling better at this point.  No 

cough.  No sputum production.  No pleurisy.  No hemoptysis.  No recent 

pneumonias.  No aspiration.  Liver function tests are abnormal and the patient 

is being worked up for nonalcoholic steatohepatitis/HOOKER.  The patient had a 

gallbladder ultrasound that showed fatty liver.  CAT scan of the abdomen and 

pelvis was also nonspecific.  He has no specific complaints for now.  

Echocardiogram that was done last year showed a preserved LV function.  BNP 

level was elevated in the 5000 range.





On 07/18/2017 the patient is doing well.  He has no specific complaints.  No 

nausea or vomiting.  He is feeling better and less short of breath compared to 

yesterday.  His tetanus sinus rhythm.  No arrhythmias has been documented.  He 

remains on anticoagulation.  As mentioned earlier, a Lasix dose will be given 

to him today to optimize his volume status.  He'll be also having a HIDA scan 

regarding her abnormal LFTs.  The values of the liver function tests are 

improving.





Objective





- Vital Signs


Vital signs: 


 Vital Signs











Temp  96.9 F L  07/18/17 11:16


 


Pulse  83   07/18/17 11:16


 


Resp  18   07/18/17 11:16


 


BP  121/70   07/18/17 11:16


 


Pulse Ox  91 L  07/18/17 11:16








 Intake & Output











 07/17/17 07/18/17 07/18/17





 18:59 06:59 18:59


 


Intake Total 480 1200 


 


Output Total  450 


 


Balance 480 750 


 


Weight 117.934 kg 123.4 kg 


 


Intake:   


 


  IV  1200 


 


    Sodium Chloride 0.9% 1,  1200 





    000 ml @ 100 mls/hr IV .   





    Q10H RAJ Rx#:[id number]   


 


  Oral 480 0 


 


Output:   


 


  Urine  450 


 


Other:   


 


  Voiding Method  Toilet 














- Exam








Head exam was generally normal. There was no scleral icterus or corneal arcus. 

Mucous membranes were moist.  Neck is short and supple and there is significant 

crowding of the posterior oropharynx.  There is no goiter or neck masses.  

Lungs sounds are diminished in lung bases bilaterally otherwise clear.Cardiac 

exam revealed the PMI to be normally situated and sized. The rhythm was regular 

and no extrasystoles were noted during several minutes of auscultation. The 

first and second heart sounds were normal and physiologic splitting of the 

second heart sound was noted. There were no murmurs, rubs, clicks, or gallops.  

Abdomen is soft nontender.  There is no organomegaly.  No direct tenderness.  

No rebound tenderness.  No guarding.  Extremities show trace edema and there is 

no cyanosis or clubbing.








- Labs


CBC & Chem 7: 


 07/18/17 06:13





 07/18/17 06:13


Labs: 


 Abnormal Lab Results - Last 24 Hours (Table)











  07/17/17 07/17/17 07/17/17 Range/Units





  12:15 12:15 12:15 


 


WBC   14.8 H   (3.8-10.6)  k/uL


 


RBC     (4.30-5.90)  m/uL


 


Hgb     (13.0-17.5)  gm/dL


 


Hct     (39.0-53.0)  %


 


Neutrophils #   11.6 H   (1.3-7.7)  k/uL


 


PT     (9.0-12.0)  sec


 


INR     (<1.2)  


 


BUN    24 H  (9-20)  mg/dL


 


Glucose    123 H  (74-99)  mg/dL


 


Calcium     (8.4-10.2)  mg/dL


 


Magnesium    2.6 H  (1.6-2.3)  mg/dL


 


Total Bilirubin    4.3 H  (0.2-1.3)  mg/dL


 


AST    723 H  (17-59)  U/L


 


ALT    1214 H  (21-72)  U/L


 


Total Creatine Kinase  54 L    ()  U/L


 


Total Protein     (6.3-8.2)  g/dL


 


Albumin     (3.5-5.0)  g/dL


 


Triglycerides     (<150)  mg/dL


 


HDL Cholesterol     (40-60)  mg/dL














  07/17/17 07/17/17 07/17/17 Range/Units





  12:15 17:56 23:32 


 


WBC     (3.8-10.6)  k/uL


 


RBC     (4.30-5.90)  m/uL


 


Hgb     (13.0-17.5)  gm/dL


 


Hct     (39.0-53.0)  %


 


Neutrophils #     (1.3-7.7)  k/uL


 


PT  21.0 H    (9.0-12.0)  sec


 


INR  2.2 H    (<1.2)  


 


BUN     (9-20)  mg/dL


 


Glucose     (74-99)  mg/dL


 


Calcium     (8.4-10.2)  mg/dL


 


Magnesium     (1.6-2.3)  mg/dL


 


Total Bilirubin     (0.2-1.3)  mg/dL


 


AST     (17-59)  U/L


 


ALT     (21-72)  U/L


 


Total Creatine Kinase   53 L  51 L  ()  U/L


 


Total Protein     (6.3-8.2)  g/dL


 


Albumin     (3.5-5.0)  g/dL


 


Triglycerides     (<150)  mg/dL


 


HDL Cholesterol     (40-60)  mg/dL














  07/18/17 07/18/17 Range/Units





  06:13 06:13 


 


WBC    (3.8-10.6)  k/uL


 


RBC   3.93 L  (4.30-5.90)  m/uL


 


Hgb   11.8 L  (13.0-17.5)  gm/dL


 


Hct   36.2 L  (39.0-53.0)  %


 


Neutrophils #    (1.3-7.7)  k/uL


 


PT    (9.0-12.0)  sec


 


INR    (<1.2)  


 


BUN  22 H   (9-20)  mg/dL


 


Glucose    (74-99)  mg/dL


 


Calcium  7.6 L   (8.4-10.2)  mg/dL


 


Magnesium    (1.6-2.3)  mg/dL


 


Total Bilirubin  1.9 H   (0.2-1.3)  mg/dL


 


AST  252 H   (17-59)  U/L


 


ALT  719 H   (21-72)  U/L


 


Total Creatine Kinase    ()  U/L


 


Total Protein  5.4 L   (6.3-8.2)  g/dL


 


Albumin  3.0 L   (3.5-5.0)  g/dL


 


Triglycerides  157 H   (<150)  mg/dL


 


HDL Cholesterol  15 L   (40-60)  mg/dL














Assessment and Plan


Plan: 





Assessment





1 acute dyspnea with presence of bilateral groundglass pulmonary infiltrates 

and a small left-sided pleural effusion.  These are most likely manifestations 

of CHF in the setting of SVT and rapid ventricular response.  Pneumonia is 

doubtful.  Malignancy is doubtful.





2 SVT, converted to sinus after a dose of adenosine.  Currently on beta 

blockers.  Fully anticoagulated.





3 pulmonary embolism, history of.  On articulation with Xarelto.  CT angios the 

chest does not show a recurrent event





4 abnormal LFTs, rule out non-alcoholic steatohepatitis/fatty liver.  Workup is 

in progress.





5 obesity





6 hyperlipidemia





7 hypertension





8 acid reflux





PLAN





Awaiting the results of the HIDA scan.  Lasix 40 mg IV push.  Continue Xarelto.

  Management of Cardizem drip per cardiology.  This can be weaned off and 

discontinued and the patient is already on metoprolol 12.5 mg by mouth twice a 

day.  IV fluids to KVO.  We'll follow.

## 2017-07-18 NOTE — P.DS
Providers


Date of admission: 


07/17/17 14:40





Expected date of discharge: 07/18/17


Attending physician: 


Luis Felipe Bentley





Consults: 





 





07/17/17 14:40


Consult Physician Urgent 


   Consulting Provider: Renzo Mccormack


   Consult Reason/Comments: svt


   Do you want consulting provider notified?: Yes





07/17/17 17:56


Consult Physician Routine 


   Consulting Provider: Jorge Braswell


   Consult Reason/Comments: ?? Pulmonary fibrosis.


   Do you want consulting provider notified?: Yes











Primary care physician: 


Luis Felipe Bentley





[facility] Course: 





51-year-old  obese male one of my office patient who had previous 

history of PE of the right lower lobe also had an elevated troponin at the time 

secondary to acute pulmonary embolism.  Patient is known to have history of 

elevated blood pressure mild tachycardia and hyperlipidemia found to have 

mildly elevated abnormal liver function tests in the past was diagnosed as [facility] 

patient has been seen in our office for the last 2 month and doing well.  He 

presented to the office today because of increased shortness of breath over the 

last few days with worsening palpitation and arrhythmia.  EKG in the office 

shows SVT with pulse running 200 bpm.  Patient was sent to [facility] department 

[facility] where was seen and evaluated was in SVT with pulse rate 

running in the very high 100 was giving 1 dose of adenosine 6 mg converted him 

down continue to tactile up little bed in the mid 100.  Patient was started on 

Cardizem drip initially is already on anticoagulation his Xarelto will be 

resume patient was hospitalized shortly after with the above problem. 

surprisingly with his lab work found to have significantly elevated liver 

function test with no explanation no acute liver injury.  Ultrasound of the 

liver showed mildly fatty liver to confirm Craft hepatitis panel still pending 

patient will go for HIDA scan to confirm any other possibility with the 

gallbladder as a nonfunctioning gallbladder.  Patient otherwise doing well 

after his admission.





7/18: Patient was seen in consultation by Dr. Lau noting that there is no 

concern for pneumonia at this time and no need for antibiotics.  The 

groundglass changes are manifestations of CHF in the setting of SVT with rapid 

ventricular response.  Triglycerides 157, cholesterol 103, LDL 57, HDL 15.  

White count is down to 10 and liver function tests are improved with total 

bilirubin of 1.9,  and .  Patient is currently on Cardizem drip 

along with metoprolol and Xarelto.  Cardiology consult is in place and Dr. Merry Garay has recommended increasing metoprolol to 25 mg twice daily and cleared for 

discharge.  HIDA scan is negative.  Patient will be discharged home today in 

stable condition.








Discharge diagnoses:


1 severe dyspnea and shortness of breath: Secondary to SVT


2 severe acute SVT


3 severely abnormal liver function test: Not a clear etiology possibly 

secondary to SVT and poor liver perfusion


4 abnormal chest x-ray ground glass opacification pneumonia has been ruled out 

by pulmonary medicine


5 hypertension


6 severe GERD


7 hyperglycemia: Has been on diet control 


8 history of PE


Discharge plan: Return home





Impression and plan of care have been directed as dictated by the signing 

physician.  Jodie Paulino nurse practitioner acting as scribe for signing 

physician.





Patient Condition at Discharge: Good





Plan - Discharge Summary


New Discharge Prescriptions: 


New


   Metoprolol Tartrate [Lopressor] 25 mg PO BID #60 tab





Continue


   Rivaroxaban [Xarelto] 20 mg PO DAILY #30 tab


   Gemfibrozil [Lopid] 600 mg PO AC-BID





Discontinued


   Metoprolol Tartrate [Lopressor] 12.5 mg PO BID


Discharge Medication List





Rivaroxaban [Xarelto] 20 mg PO DAILY #30 tab 06/01/16 [Rx]


Gemfibrozil [Lopid] 600 mg PO AC-BID 01/12/17 [History]


Metoprolol Tartrate [Lopressor] 25 mg PO BID #60 tab 07/18/17 [Rx]








Follow up Appointment(s)/Referral(s): 


Luis Felipe Bentley MD [Primary Care Provider] - 1 Week


Discharge Disposition: HOME SELF-CARE

## 2017-07-18 NOTE — P.CRDCN
History of Present Illness


Consult date: 07/18/17


Reason for Consult (text): 


SVT





Chief complaint: shortness of breath, palpitations


History of present illness: 


This is a pleasant 51-year-old gentleman who follows with Dr. Shearer in the 

office.  He has a known history of atrial fibrillation, status post 

radiofrequency ablation, hypertriglyceridemia, and fatty liver.  He presented 

to his primary care physician's office yesterday morning with complaints of 

increasing shortness of breath and was found to be in an SVT with a heart rate 

of around 200 bpm.  [facility] emergency department at which time he started a 

Cardizem drip and patient remained in SVT.  Was then given adenosine 12 mg and 

patient converted to sinus rhythm.  He had apparently been feeling his heart 

racing for 2-3 days prior to coming to the hospital.  He had had a low-grade 

temp at home with some nausea and vomiting several days ago along with a dry 

cough.  Liver enzymes were shown to be quite a bit more elevated than usual, 

workup should patient have fatty liver.  BNP was elevated at 6000 likely 

related to rapid heart rate.  Computed tomography scan of the chest showed 

bilateral multifocal central ground glass opacities suggestive of edema with 

small effusions and Lasix 40 mg IV push 1 was given.  There is no evidence for 

PE.  Since admission, patient has been feeling quite a bit better.  He remains 

in sinus rhythm.  He is currently on metoprolol 12.5 mg by mouth twice a day.








Past Medical History


Past Medical History: Atrial Fibrillation, Pneumonia, Pulmonary Embolus (PE)


Additional Past Medical History / Comment(s): Obesity, pulmonary embolism, 

hyperlipidemia, hypertriglyceridemia, fatty liver, acid reflux


History of Any Multi-Drug Resistant Organisms: None Reported


Past Surgical History: Heart Catheterization


Additional Past Surgical History / Comment(s): heart ablation


Past Anesthesia/Blood Transfusion Reactions: No Reported Reaction


Smoking Status: Never smoker





- Past Family History


  ** Mother


Family Medical History: No Reported History, Coronary Artery Disease (CAD)


Additional Family Medical History / Comment(s): CABG in 70's,  one brother 

healthy no sisters no.daughter has no sons





  ** Father


Family Medical History: No Reported History





Medications and Allergies


 Home Medications











 Medication  Instructions  Recorded  Confirmed  Type


 


Gemfibrozil [Lopid] 600 mg PO AC-BID 01/12/17 07/17/17 History











 Allergies











Allergy/AdvReac Type Severity Reaction Status Date / Time


 


No Known Allergies Allergy   Verified 07/17/17 13:18














Physical Exam


Vitals: 


 Vital Signs











  Temp Pulse Pulse Pulse Resp BP BP


 


 07/18/17 11:16  96.9 F L   83   18   121/70


 


 07/18/17 08:00  97.2 F L   84   18   102/69


 


 07/18/17 04:00  98.1 F    84  18   130/65


 


 07/18/17 00:00  98.5 F   85  85  18   117/80


 


 07/17/17 20:00  99.1 F   96  96  18   130/82


 


 07/17/17 18:02  98.0 F   87   18   114/68


 


 07/17/17 15:00  97.8 F  93    18  113/83 














  Pulse Ox


 


 07/18/17 11:16  91 L


 


 07/18/17 08:00  92 L


 


 07/18/17 04:00  91 L


 


 07/18/17 00:00  95


 


 07/17/17 20:00  90 L


 


 07/17/17 18:02  93 L


 


 07/17/17 15:00  98








 Intake and Output











 07/17/17 07/18/17 07/18/17





 22:59 06:59 14:59


 


Intake Total 480 1200 


 


Output Total 200 250 


 


Balance 280 950 


 


Intake:   


 


  IV  1200 


 


    Sodium Chloride 0.9% 1,  1200 





    000 ml @ 100 mls/hr IV .   





    Q10H [facility] Rx#:[id number]   


 


  Oral 480 0 


 


Output:   


 


  Urine 200 250 


 


Other:   


 


  Voiding Method Toilet Toilet 


 


  Weight 117.934 kg 123.4 kg 











PHYSICAL EXAMINATION: 





HEENT: Head is atraumatic, normocephalic.  Pupils equal, round.  Neck is 

supple.  There is no elevated jugular venous pressure.





HEART EXAMINATION: Heart sounds regular, S1 and S2 normal.  No murmur or gallop 

heard.





CHEST EXAMINATION: Lungs are clear to auscultation and precussion. No chest 

wall tenderness is noted on palpation or with deep breathing.





ABDOMEN:  Soft, nontender. Bowel sounds are heard. No organomegaly noted.


 


EXTREMITIES: 2+ peripheral pulses with no evidence of peripheral edema and no 

calf tenderness noted.





NEUROLOGIC patient is awake, alert and oriented x3.


 


.


 











Results





 07/18/17 06:13





 07/18/17 06:13


 Cardiac Enzymes











  07/17/17 07/17/17 07/18/17 Range/Units





  17:56 23:32 06:13 


 


AST    252 H  (17-59)  U/L


 


CK-MB (CK-2)  0.9  0.8   (0.0-2.4)  ng/mL


 


Troponin I  0.029  0.028   (0.000-0.034)  ng/mL








 Lipids











  07/18/17 Range/Units





  06:13 


 


Triglycerides  157 H  (<150)  mg/dL


 


Cholesterol  103  (<200)  mg/dL


 


HDL Cholesterol  15 L  (40-60)  mg/dL








 CBC











  07/18/17 Range/Units





  06:13 


 


WBC  10.5  (3.8-10.6)  k/uL


 


RBC  3.93 L  (4.30-5.90)  m/uL


 


Hgb  11.8 L  (13.0-17.5)  gm/dL


 


Hct  36.2 L  (39.0-53.0)  %


 


Plt Count  286  (150-450)  k/uL








 Comprehensive Metabolic Panel











  07/18/17 Range/Units





  06:13 


 


Sodium  139  (137-145)  mmol/L


 


Potassium  3.9  (3.5-5.1)  mmol/L


 


Chloride  106  ()  mmol/L


 


Carbon Dioxide  22  (22-30)  mmol/L


 


BUN  22 H  (9-20)  mg/dL


 


Creatinine  1.00  (0.66-1.25)  mg/dL


 


Glucose  85  (74-99)  mg/dL


 


Calcium  7.6 L  (8.4-10.2)  mg/dL


 


AST  252 H  (17-59)  U/L


 


ALT  719 H  (21-72)  U/L


 


Alkaline Phosphatase  105  ()  U/L


 


Total Protein  5.4 L  (6.3-8.2)  g/dL


 


Albumin  3.0 L  (3.5-5.0)  g/dL








 Current Medications











Generic Name Dose Route Start Last Admin





  Trade Name Freq  PRN Reason Stop Dose Admin


 


Aspirin  325 mg  07/18/17 09:00  07/18/17 10:45





  Aspirin  PO   325 mg





  DAILY RAJ   Administration


 


Famotidine  20 mg  07/18/17 09:00  07/18/17 10:45





  Pepcid  PO   20 mg





  DAILY RAJ   Administration


 


Gemfibrozil  600 mg  07/18/17 07:30  07/18/17 10:45





  Lopid  PO   600 mg





  AC-BID RAJ   Administration


 


Sodium Chloride  1,000 mls @ 100 mls/hr  07/17/17 14:45  07/18/17 10:50





  Saline 0.9%  IV   100 mls/hr





  .Q10H RAJ   Administration


 


Metoprolol Tartrate  25 mg  07/18/17 21:00  





  Lopressor  PO   





  BID RAJ   


 


Miscellaneous Information  1 each  07/17/17 13:09  07/17/17 14:06





  Rx Info: Iv Contrast Was Given  MISCELLANE  07/19/17 13:09  1 each





  DAILY PRN   Administration





  Per Protocol   


 


Nitroglycerin  0.4 mg  07/17/17 14:40  





  Nitrostat  SUBLINGUAL   





  Q5M PRN   





  Chest Pain   


 


Rivaroxaban  20 mg  07/18/17 09:00  07/18/17 10:45





  Xarelto  PO   20 mg





  DAILY RAJ   Administration








 Intake and Output











 07/17/17 07/18/17 07/18/17





 22:59 06:59 14:59


 


Intake Total 480 1200 


 


Output Total 200 250 


 


Balance 280 950 


 


Intake:   


 


  IV  1200 


 


    Sodium Chloride 0.9% 1,  1200 





    000 ml @ 100 mls/hr IV .   





    Q10H RAJ Rx#:[id number]   


 


  Oral 480 0 


 


Output:   


 


  Urine 200 250 


 


Other:   


 


  Voiding Method Toilet Toilet 


 


  Weight 117.934 kg 123.4 kg 








 





 07/18/17 06:13 





 07/18/17 06:13 











EKG Interpretations (text)


SVT with PVCs








Assessment and Plan


Plan: 


Assessment and plan





#1 SVT, currently maintaining sinus rhythm


#2 HOOKER, with elevation of liver enzymes from baseline likely secondary to 

congestion


#3 history of paroxysmal atrial fibrillation, status post ablation, on Xarelto


#4 BNP elevation in the setting of prolonged episode of SVT





From cardiology's perspective, we will increase metoprolol tartrate to 25 mg by 

mouth twice a day.  Patient is stable for discharge home he will follow-up with 

Dr. Shearer in the office in about 2-3 weeks.





NP note has been reviewed, I agree with a documented findings and plan of care.

  Patient was seen and examined.

## 2017-11-14 ENCOUNTER — HOSPITAL ENCOUNTER (OUTPATIENT)
Dept: HOSPITAL 47 - RADCTMAIN | Age: 52
Discharge: HOME | End: 2017-11-14
Payer: COMMERCIAL

## 2017-11-14 DIAGNOSIS — E27.8: Primary | ICD-10-CM

## 2017-11-14 DIAGNOSIS — D35.02: ICD-10-CM

## 2017-11-14 PROCEDURE — 74178 CT ABD&PLV WO CNTR FLWD CNTR: CPT

## 2017-11-14 NOTE — CT
EXAMINATION TYPE: CT abdomen pelvis wo/w con

 

DATE OF EXAM: 11/14/2017

 

COMPARISON: 7/17/2017, 10/11/2016, 6/1/2016

 

HISTORY: 52-year-old male Neoplasm of left adrenal gland

 

TECHNIQUE: Contiguous axial scanning of the abdomen and pelvis before and after administration of 100
 ml Omnipaque 300 IV contrast.  Delayed images through the kidneys and coronal/sagittal reconstructio
ns performed.

 

CT DLP: 4671.6 mGycm

Automated exposure control for dose reduction was used.

 

 

FINDINGS:

Heart is normal size without pericardial effusion. Trace residual pleural thickening posterior left b
ase. No definite pleural effusion.

 

Tiny hiatal hernia.

 

No focal liver lesion or biliary ductal dilatation. Portal venous system is patent.

 

Gallbladder, right adrenal gland, kidneys, spleen, and pancreas appear within normal limits.

 

Redemonstrated nodule within the left adrenal gland measuring 2.3 cm. This is unchanged from 6/1/2016
 and shows attenuation of -9 Hounsfield units on the precontrast series compatible with a benign lipi
d rich adrenal adenoma.

 

No dilated small bowel, free fluid, or free air. Scattered nonenlarged mesenteric lymph nodes are pre
sent.

 

Bladder incompletely distended. Prostate gland measures 4.1 cm wide with some central calcifications.
 No abnormal fluid collection in the pelvis or pelvic lymphadenopathy.

 

Bones: No osseous destructive process.

 

 

 

 

IMPRESSION: 

 

THE 2.3 CM LEFT ADRENAL GLAND NODULE IS STABLE FROM 6/1/2016. IN ADDITION, DENSITY CHARACTERISTICS AR
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

## 2018-02-26 ENCOUNTER — HOSPITAL ENCOUNTER (OUTPATIENT)
Dept: HOSPITAL 47 - RADCTMAIN | Age: 53
Discharge: HOME | End: 2018-02-26
Payer: COMMERCIAL

## 2018-02-26 DIAGNOSIS — R10.9: ICD-10-CM

## 2018-02-26 DIAGNOSIS — E27.9: Primary | ICD-10-CM

## 2018-02-26 PROCEDURE — 74177 CT ABD & PELVIS W/CONTRAST: CPT

## 2018-02-26 NOTE — CT
EXAMINATION TYPE: CT abdomen pelvis w con

 

DATE OF EXAM: 2/26/2018

 

COMPARISON: 11/14/2017

 

HISTORY: Abdominal pain around umbilical area x1 week

 

CT DLP: 1826.3 mGycm

Automated exposure control for dose reduction was used.

 

TECHNIQUE:  Helical acquisition of images was performed from the lung bases through the pelvis.

 

CONTRAST: 

Performed with Oral Contrast and with IV Contrast, patient injected with 100 mL of Omnipaque 300.

 

FINDINGS: 

 

Lung bases are clear. There is no pleural effusion.

 

Liver spleen pancreas gallbladder appear normal. Bile ducts are not dilated.

 

There is a 2 cm low-density left adrenal mass. Right adrenal gland appears normal. Kidneys show satis
factory contrast opacification. There is no hydronephrosis. Ureters are not dilated. There is no retr
operitoneal adenopathy. There is no ascites. Bladder distends smoothly. There is no evidence of a pel
ignacio mass.

 

I see no intestinal wall thickening. There are no dilated loops. Appendix appears normal. I see no delvis
ny destructive process. There are 1 cm cortical cyst in the right kidney. There is no evidence of a h
ernia. Umbilicus appears normal.

IMPRESSION: 

LOW-DENSITY LEFT ADRENAL MASS IS STABLE COMPARED TO OLD EXAM AND CONSISTENT WITH BENIGN DISEASE. NO S
IGN OF ACUTE ABDOMEN AND PELVIS. NO ADVERSE CHANGE COMPARED TO OLD EXAM.

## 2018-04-25 ENCOUNTER — HOSPITAL ENCOUNTER (INPATIENT)
Dept: HOSPITAL 47 - EC | Age: 53
LOS: 1 days | Discharge: HOME | DRG: 309 | End: 2018-04-26
Payer: COMMERCIAL

## 2018-04-25 DIAGNOSIS — E66.9: ICD-10-CM

## 2018-04-25 DIAGNOSIS — Z79.899: ICD-10-CM

## 2018-04-25 DIAGNOSIS — Z82.49: ICD-10-CM

## 2018-04-25 DIAGNOSIS — I47.1: Primary | ICD-10-CM

## 2018-04-25 DIAGNOSIS — F33.9: ICD-10-CM

## 2018-04-25 DIAGNOSIS — F41.1: ICD-10-CM

## 2018-04-25 DIAGNOSIS — Z87.891: ICD-10-CM

## 2018-04-25 DIAGNOSIS — E78.1: ICD-10-CM

## 2018-04-25 DIAGNOSIS — Z86.711: ICD-10-CM

## 2018-04-25 DIAGNOSIS — Z79.01: ICD-10-CM

## 2018-04-25 DIAGNOSIS — E78.5: ICD-10-CM

## 2018-04-25 DIAGNOSIS — K76.0: ICD-10-CM

## 2018-04-25 DIAGNOSIS — K21.9: ICD-10-CM

## 2018-04-25 DIAGNOSIS — Z87.01: ICD-10-CM

## 2018-04-25 LAB
ALBUMIN SERPL-MCNC: 4.3 G/DL (ref 3.5–5)
ALP SERPL-CCNC: 55 U/L (ref 38–126)
ALT SERPL-CCNC: 71 U/L (ref 21–72)
ANION GAP SERPL CALC-SCNC: 14 MMOL/L
APTT BLD: 28.2 SEC (ref 22–30)
AST SERPL-CCNC: 50 U/L (ref 17–59)
BASOPHILS # BLD AUTO: 0.1 K/UL (ref 0–0.2)
BASOPHILS NFR BLD AUTO: 0 %
BUN SERPL-SCNC: 17 MG/DL (ref 9–20)
CALCIUM SPEC-MCNC: 9.2 MG/DL (ref 8.4–10.2)
CHLORIDE SERPL-SCNC: 107 MMOL/L (ref 98–107)
CO2 SERPL-SCNC: 24 MMOL/L (ref 22–30)
EOSINOPHIL # BLD AUTO: 0.2 K/UL (ref 0–0.7)
EOSINOPHIL NFR BLD AUTO: 2 %
ERYTHROCYTE [DISTWIDTH] IN BLOOD BY AUTOMATED COUNT: 5.08 M/UL (ref 4.3–5.9)
ERYTHROCYTE [DISTWIDTH] IN BLOOD: 13.3 % (ref 11.5–15.5)
GLUCOSE SERPL-MCNC: 98 MG/DL (ref 74–99)
HCT VFR BLD AUTO: 44.2 % (ref 39–53)
HGB BLD-MCNC: 14.5 GM/DL (ref 13–17.5)
INR PPP: 1.2 (ref ?–1.2)
LYMPHOCYTES # SPEC AUTO: 2.2 K/UL (ref 1–4.8)
LYMPHOCYTES NFR SPEC AUTO: 19 %
MAGNESIUM SPEC-SCNC: 2.1 MG/DL (ref 1.6–2.3)
MCH RBC QN AUTO: 28.6 PG (ref 25–35)
MCHC RBC AUTO-ENTMCNC: 32.8 G/DL (ref 31–37)
MCV RBC AUTO: 87.1 FL (ref 80–100)
MONOCYTES # BLD AUTO: 0.7 K/UL (ref 0–1)
MONOCYTES NFR BLD AUTO: 6 %
NEUTROPHILS # BLD AUTO: 8.2 K/UL (ref 1.3–7.7)
NEUTROPHILS NFR BLD AUTO: 72 %
PLATELET # BLD AUTO: 318 K/UL (ref 150–450)
POTASSIUM SERPL-SCNC: 4.6 MMOL/L (ref 3.5–5.1)
PROT SERPL-MCNC: 6.6 G/DL (ref 6.3–8.2)
PT BLD: 11.4 SEC (ref 9–12)
SODIUM SERPL-SCNC: 145 MMOL/L (ref 137–145)
WBC # BLD AUTO: 11.4 K/UL (ref 3.8–10.6)

## 2018-04-25 PROCEDURE — 85610 PROTHROMBIN TIME: CPT

## 2018-04-25 PROCEDURE — 80053 COMPREHEN METABOLIC PANEL: CPT

## 2018-04-25 PROCEDURE — 84443 ASSAY THYROID STIM HORMONE: CPT

## 2018-04-25 PROCEDURE — 96361 HYDRATE IV INFUSION ADD-ON: CPT

## 2018-04-25 PROCEDURE — 85025 COMPLETE CBC W/AUTO DIFF WBC: CPT

## 2018-04-25 PROCEDURE — 83735 ASSAY OF MAGNESIUM: CPT

## 2018-04-25 PROCEDURE — 84484 ASSAY OF TROPONIN QUANT: CPT

## 2018-04-25 PROCEDURE — 36415 COLL VENOUS BLD VENIPUNCTURE: CPT

## 2018-04-25 PROCEDURE — 99285 EMERGENCY DEPT VISIT HI MDM: CPT

## 2018-04-25 PROCEDURE — 85730 THROMBOPLASTIN TIME PARTIAL: CPT

## 2018-04-25 PROCEDURE — 96360 HYDRATION IV INFUSION INIT: CPT

## 2018-04-25 PROCEDURE — 93005 ELECTROCARDIOGRAM TRACING: CPT

## 2018-04-25 PROCEDURE — 71046 X-RAY EXAM CHEST 2 VIEWS: CPT

## 2018-04-25 PROCEDURE — 71275 CT ANGIOGRAPHY CHEST: CPT

## 2018-04-25 RX ADMIN — METOPROLOL TARTRATE SCH MG: 25 TABLET, FILM COATED ORAL at 22:06

## 2018-04-25 RX ADMIN — RIVAROXABAN SCH MG: 20 TABLET, FILM COATED ORAL at 21:19

## 2018-04-25 RX ADMIN — FAMOTIDINE SCH MG: 20 TABLET, FILM COATED ORAL at 22:06

## 2018-04-25 NOTE — XR
EXAMINATION TYPE: XR chest 2V

 

DATE OF EXAM: 4/25/2018

 

COMPARISON: 7/17/2017

 

TECHNIQUE: PA and lateral views submitted.

 

HISTORY: Chest pain

 

FINDINGS:

The lungs are clear and  there is no pneumothorax, pleural effusion, or focal pneumonia.  Bilateral p
leural-based thickening. There is a calcification or linear density overlying the aortic knob. This m
ay be related to superimposed structures rather than displacement of the intimal flap or dissection o
f the aorta. Correlate clinically. Case discussed with emergency room physician. CT scan suggested.

 

IMPRESSION: 

1. See above.

## 2018-04-25 NOTE — ED
Arrhythmia/Palpitations HPI





- General


Chief Complaint: Arrhythmia/Palpitations


Stated Complaint: cardiac


Time Seen by Provider: 04/25/18 13:19


Source: patient


Mode of arrival: EMS


Limitations: no limitations





- History of Present Illness


Initial Comments: 





Patient states that he was getting out of the shower when he developed 

palpitations.  He had no chest pain, pressure, shortness of breath, 

lightheadedness or dizziness.  He denies syncope.  He has no pain or swelling 

the legs.  He has no symptoms at this time.  He denies any sick contacts or 

travel.  He has had no long plane or car rides.  Took no medication for this 

himself, but was given adenosine prior to arrival.  According to EMS, he was 

found to be SVT, converted to normal sinus rhythm after IV adenosine.  

Currently the patient has no symptoms at all.





- Related Data


 Home Medications











 Medication  Instructions  Recorded  Confirmed


 


ALPRAZolam [Xanax] 0.25 mg PO TID PRN 04/25/18 04/25/18


 


Escitalopram [Lexapro] 20 mg PO DAILY 04/25/18 04/25/18


 


Rivaroxaban [Xarelto] 20 mg PO BID 04/25/18 04/25/18








 Previous Rx's











 Medication  Instructions  Recorded


 


Metoprolol Tartrate [Lopressor] 25 mg PO BID #60 tab 07/18/17











 Allergies











Allergy/AdvReac Type Severity Reaction Status Date / Time


 


No Known Allergies Allergy   Verified 04/25/18 13:17














Review of Systems


ROS Statement: 


Those systems with pertinent positive or pertinent negative responses have been 

documented in the HPI.





ROS Other: All systems not noted in ROS Statement are negative.





Past Medical History


Past Medical History: Atrial Fibrillation, Pneumonia, Pulmonary Embolus (PE)


Additional Past Medical History / Comment(s): Obesity, pulmonary embolism, 

hyperlipidemia, hypertriglyceridemia, fatty liver, acid reflux, svt


History of Any Multi-Drug Resistant Organisms: None Reported


Past Surgical History: Heart Catheterization


Additional Past Surgical History / Comment(s): heart ablation


Past Anesthesia/Blood Transfusion Reactions: No Reported Reaction


Past Psychological History: No Psychological Hx Reported


Smoking Status: Never smoker


Past Alcohol Use History: None Reported


Past Drug Use History: None Reported





- Past Family History


  ** Mother


Family Medical History: No Reported History, Coronary Artery Disease (CAD)


Additional Family Medical History / Comment(s): CABG in 70's,  one brother 

healthy no sisters no.daughter has no sons





  ** Father


Family Medical History: No Reported History





General Exam


Limitations: no limitations


General appearance: alert, in no apparent distress


Head exam: Present: atraumatic, normocephalic, normal inspection


Eye exam: Present: normal appearance, PERRL, EOMI.  Absent: scleral icterus, 

conjunctival injection, periorbital swelling


ENT exam: Present: normal exam, mucous membranes moist


Neck exam: Present: normal inspection.  Absent: tenderness, meningismus, 

lymphadenopathy


Respiratory exam: Present: normal lung sounds bilaterally.  Absent: respiratory 

distress, wheezes, rales, rhonchi, stridor


Cardiovascular Exam: Present: regular rate, normal rhythm, normal heart sounds.

  Absent: systolic murmur, diastolic murmur, rubs, gallop, clicks


GI/Abdominal exam: Present: soft, normal bowel sounds.  Absent: distended, 

tenderness, guarding, rebound, rigid


Extremities exam: Present: normal inspection, full ROM, normal capillary 

refill.  Absent: tenderness, pedal edema, joint swelling, calf tenderness


Back exam: Present: normal inspection


Neurological exam: Present: alert, oriented X3, CN II-XII intact


Psychiatric exam: Present: normal affect, normal mood


Skin exam: Present: warm, dry, intact, normal color.  Absent: rash





Course


 Vital Signs











  04/25/18 04/25/18





  13:12 14:50


 


Pulse Rate 102 H 88


 


Respiratory 18 18





Rate  


 


Blood Pressure 124/80 106/69


 


O2 Sat by Pulse 94 L 98





Oximetry  














EKG Findings





- EKG Comments:


EKG Findings:: Twelve-lead EKG shows ventricular rate 95 bpm, normal UT 

interval and Rk complex is, no ST elevation or depression, interpreted by 

me as normal sinus rhythm.





Medical Decision Making





- Medical Decision Making





Patient presented with SVT.  He does have a positive troponin.  I will consult 

cardiology.  Patient will be admitted to the hospital.





- Lab Data


Result diagrams: 


 04/25/18 13:50





 04/25/18 13:50


 Lab Results











  04/25/18 04/25/18 04/25/18 Range/Units





  13:50 13:50 13:50 


 


WBC  11.4 H    (3.8-10.6)  k/uL


 


RBC  5.08    (4.30-5.90)  m/uL


 


Hgb  14.5    (13.0-17.5)  gm/dL


 


Hct  44.2    (39.0-53.0)  %


 


MCV  87.1    (80.0-100.0)  fL


 


MCH  28.6    (25.0-35.0)  pg


 


MCHC  32.8    (31.0-37.0)  g/dL


 


RDW  13.3    (11.5-15.5)  %


 


Plt Count  318    (150-450)  k/uL


 


Neutrophils %  72    %


 


Lymphocytes %  19    %


 


Monocytes %  6    %


 


Eosinophils %  2    %


 


Basophils %  0    %


 


Neutrophils #  8.2 H    (1.3-7.7)  k/uL


 


Lymphocytes #  2.2    (1.0-4.8)  k/uL


 


Monocytes #  0.7    (0-1.0)  k/uL


 


Eosinophils #  0.2    (0-0.7)  k/uL


 


Basophils #  0.1    (0-0.2)  k/uL


 


PT    11.4  (9.0-12.0)  sec


 


INR    1.2 H  (<1.2)  


 


APTT    28.2  (22.0-30.0)  sec


 


Sodium   145   (137-145)  mmol/L


 


Potassium   4.6   (3.5-5.1)  mmol/L


 


Chloride   107   ()  mmol/L


 


Carbon Dioxide   24   (22-30)  mmol/L


 


Anion Gap   14   mmol/L


 


BUN   17   (9-20)  mg/dL


 


Creatinine   1.05   (0.66-1.25)  mg/dL


 


Est GFR (CKD-EPI)AfAm   >90   (>60 ml/min/1.73 sqM)  


 


Est GFR (CKD-EPI)NonAf   83   (>60 ml/min/1.73 sqM)  


 


Glucose   98   (74-99)  mg/dL


 


Calcium   9.2   (8.4-10.2)  mg/dL


 


Magnesium   2.1   (1.6-2.3)  mg/dL


 


Total Bilirubin   0.5   (0.2-1.3)  mg/dL


 


AST   50   (17-59)  U/L


 


ALT   71   (21-72)  U/L


 


Alkaline Phosphatase   55   ()  U/L


 


Troponin I     (0.000-0.034)  ng/mL


 


Total Protein   6.6   (6.3-8.2)  g/dL


 


Albumin   4.3   (3.5-5.0)  g/dL


 


TSH   2.210   (0.465-4.680)  mIU/L














  04/25/18 Range/Units





  13:50 


 


WBC   (3.8-10.6)  k/uL


 


RBC   (4.30-5.90)  m/uL


 


Hgb   (13.0-17.5)  gm/dL


 


Hct   (39.0-53.0)  %


 


MCV   (80.0-100.0)  fL


 


MCH   (25.0-35.0)  pg


 


MCHC   (31.0-37.0)  g/dL


 


RDW   (11.5-15.5)  %


 


Plt Count   (150-450)  k/uL


 


Neutrophils %   %


 


Lymphocytes %   %


 


Monocytes %   %


 


Eosinophils %   %


 


Basophils %   %


 


Neutrophils #   (1.3-7.7)  k/uL


 


Lymphocytes #   (1.0-4.8)  k/uL


 


Monocytes #   (0-1.0)  k/uL


 


Eosinophils #   (0-0.7)  k/uL


 


Basophils #   (0-0.2)  k/uL


 


PT   (9.0-12.0)  sec


 


INR   (<1.2)  


 


APTT   (22.0-30.0)  sec


 


Sodium   (137-145)  mmol/L


 


Potassium   (3.5-5.1)  mmol/L


 


Chloride   ()  mmol/L


 


Carbon Dioxide   (22-30)  mmol/L


 


Anion Gap   mmol/L


 


BUN   (9-20)  mg/dL


 


Creatinine   (0.66-1.25)  mg/dL


 


Est GFR (CKD-EPI)AfAm   (>60 ml/min/1.73 sqM)  


 


Est GFR (CKD-EPI)NonAf   (>60 ml/min/1.73 sqM)  


 


Glucose   (74-99)  mg/dL


 


Calcium   (8.4-10.2)  mg/dL


 


Magnesium   (1.6-2.3)  mg/dL


 


Total Bilirubin   (0.2-1.3)  mg/dL


 


AST   (17-59)  U/L


 


ALT   (21-72)  U/L


 


Alkaline Phosphatase   ()  U/L


 


Troponin I  0.047 H*  (0.000-0.034)  ng/mL


 


Total Protein   (6.3-8.2)  g/dL


 


Albumin   (3.5-5.0)  g/dL


 


TSH   (0.465-4.680)  mIU/L














Disposition


Clinical Impression: 


 NSTEMI (non-ST elevated myocardial infarction)





Disposition: ADMITTED AS IP TO THIS HOSP


Condition: Fair


Is patient prescribed a controlled substance at d/c from ED?: No


Referrals: 


Luis Felipe Bentley MD [Primary Care Provider] - 1-2 days

## 2018-04-25 NOTE — CT
EXAMINATION TYPE: CT angio chest

 

DATE OF EXAM: 4/25/2018

 

COMPARISON: 7/17/2017

 

HISTORY: Tachycardia.

 

CT DLP: 605 mGycm. Automated Exposure Control for Dose Reduction was Utilized.

 

 

CONTRAST: 

CTA scan of the thorax is performed with IV Contrast, patient injected with 79 mL of Isovue 370, pulm
onary embolism protocol.  MIP Images are created on CT scanner and reviewed.

 

FINDINGS:

 

LUNGS: The previously seen multifocal opacities have resolved in the interim. The previously seen lef
t pleural effusion has also resolved in the interim. There is prominent subpleural fat bilaterally wi
th minimal bibasilar subsegmental dependent atelectasis. No pneumothorax is seen.  The tracheobronchi
al tree is patent.

 

MEDIASTINUM: There is satisfactory enhancement of the pulmonary artery and its branches, there is no 
CT evidence for pulmonary embolism.  There are no greater than 1 cm hilar or mediastinal lymph nodes.
   No cardiomegaly or pericardial effusion is seen. 

 

OTHER: Macroscopic fat attenuated lipid rich left adrenal adenoma or myelolipoma is seen measuring 2.
7 cm. A benign finding. There is diffuse hypoattenuation of the hepatic parenchyma meeting criteria f
or hepatic steatosis. Small splenule is seen adjacent to the native spleen.

 

IMPRESSION: 

1. No CT evidence of pulmonary embolus.

2. Resolution of the previously seen multifocal opacities an left pleural effusion.

3. Hepatic steatosis and benign left adrenal gland lesion representing either a lipid rich adenoma or
 myelolipoma.

## 2018-04-26 VITALS — SYSTOLIC BLOOD PRESSURE: 119 MMHG | HEART RATE: 88 BPM | DIASTOLIC BLOOD PRESSURE: 78 MMHG

## 2018-04-26 VITALS — TEMPERATURE: 98.4 F | RESPIRATION RATE: 16 BRPM

## 2018-04-26 RX ADMIN — METOPROLOL TARTRATE SCH MG: 25 TABLET, FILM COATED ORAL at 08:56

## 2018-04-26 RX ADMIN — RIVAROXABAN SCH MG: 20 TABLET, FILM COATED ORAL at 08:56

## 2018-04-26 RX ADMIN — FAMOTIDINE SCH MG: 20 TABLET, FILM COATED ORAL at 08:57

## 2018-04-26 NOTE — P.CRDCN
History of Present Illness


History of present illness: 





52-year-old male patient who presented with a rapid heartbeat associated with 

mild shortness of breath, respirations and lightheadedness


Twelve-lead ECG showed supraventricular tachycardia


This was adenosine sensitive


Patient was already on metoprolol 25 mg twice daily





No chest discomfort no loss of consciousness





Review of systems: No fever chills or rigors, no cough, phlegm or expectoration

, no nausea, vomiting or diarrhea, no hematuria, dysuria, no musculoskeletal 

complaints, no strokes or seizures, no skin lesions.





Past history of pulmonary and is in, on Xarelto now, dyslipidemia, increased 

BMI.  Past history of recurrent supraventricular tachycardia documented.  2 

episodes that required IV adenosine administration.  Initially the tachycardia 

was sensitive to Valsalva maneuvers but this time it did not work.





He denies any diabetes hypertension or coronary artery disease





Social history: Past history of smoking medications include metoprolol and 

xarelto and Lexapro and Xanax 





NO KNOWN DRUG ALLERGIES





On examination on admission he was afebrile.  Pulse rate in the 90s blood 

pressure 112/73 mmHg


Breath sounds are clear no rhonchi no crackles


Heart sounds are normal normal S1 normal S2


Abdomen soft nontender


70s warm no edema


Increased BMI





Baseline telemetry ECG shows sinus rhythm no delta waves normal NE narrow QRS 

normal ST segments





SVT ECG from the 05/30/2016 reviewed.  Ventricular rate 214 beats a minute 

narrow QRS no clear-cut P waves


This time the SVT rate was slower





Impression


Recurrent symptomatic drug refractory supraventricular tachycardia


History of pulmonary emboli on Xarelto currently


Increased BMI


Dyslipidemia


Past history of smoking





Suggest


EP study and radiofrequency ablation of SVT


Detailed discussion with the patient


Risks and benefits discussed.  Success rates and failure rates and complication 

rates explained complications discussed including cardiac puncture heart block





Prior to the procedure the patient will hold beta blockers for 48 hours 


he will continue xarelto uninterrupted, for the procedure


Heparin will be administered during the procedure in addition to continuing 

xarelto





In the interim increase metoprolol to 50 mrem twice daily and follow with Dr. Lopez/ Enedelia, nurse practitioner, within the next 4 weeks























Past Medical History


Past Medical History: Hyperlipidemia, Pneumonia, Pulmonary Embolus (PE)


Additional Past Medical History / Comment(s): Obesity, pulmonary embolism, 

hyperlipidemia, hypertriglyceridemia, fatty liver, acid reflux, svt.  pt stated 

he has had a pne vacine in past-unable to verify -dr office closed and [facility] had 

no record.


History of Any Multi-Drug Resistant Organisms: None Reported


Past Surgical History: Cardiac Ablation, Heart Catheterization


Additional Past Surgical History / Comment(s): heart ablation


Past Anesthesia/Blood Transfusion Reactions: No Reported Reaction


Smoking Status: Former smoker





- Past Family History


  ** Mother


Family Medical History: Coronary Artery Disease (CAD)


Additional Family Medical History / Comment(s): CABG in 70's,  one brother 

healthy no sisters no.daughter has no sons





  ** Father


Family Medical History: No Reported History





Medications and Allergies


 Home Medications











 Medication  Instructions  Recorded  Confirmed  Type


 


Metoprolol Tartrate [Lopressor] 25 mg PO BID #60 tab 07/18/17 04/25/18 Rx


 


ALPRAZolam [Xanax] 0.25 mg PO TID PRN 04/25/18 04/25/18 History


 


Escitalopram [Lexapro] 20 mg PO DAILY 04/25/18 04/25/18 History


 


Rivaroxaban [Xarelto] 20 mg PO DAILY #0 04/26/18 04/25/18 Rx











 Allergies











Allergy/AdvReac Type Severity Reaction Status Date / Time


 


No Known Allergies Allergy   Verified 04/25/18 13:17














Physical Exam


Vitals: 


 Vital Signs











  Temp Pulse Pulse Resp BP BP BP


 


 04/26/18 12:00    88  16    119/78


 


 04/26/18 09:00     16   


 


 04/26/18 08:58  98.4 F   98  16    124/71


 


 04/26/18 04:00  97.8 F   94  18   112/73 


 


 04/26/18 00:00  100.0 F H   94  18   99/64  117/68


 


 04/25/18 20:00  99.3 F   96  18   119/74 


 


 04/25/18 17:35  97.8 F  93   18  100/68  


 


 04/25/18 16:24   90   18  117/77  














  Pulse Ox


 


 04/26/18 12:00  97


 


 04/26/18 09:00 


 


 04/26/18 08:58  94 L


 


 04/26/18 04:00  94 L


 


 04/26/18 00:00  95


 


 04/25/18 20:00  95


 


 04/25/18 17:35  97


 


 04/25/18 16:24  99








 Intake and Output











 04/26/18 04/26/18 04/26/18





 06:59 14:59 22:59


 


Intake Total  222 


 


Balance  222 


 


Intake:   


 


  Oral  222 


 


Other:   


 


  # Voids 2 2 


 


  Weight 114.2 kg  














Results





 04/25/18 13:50





 04/25/18 13:50


 Cardiac Enzymes











  04/25/18 04/26/18 Range/Units





  20:25 01:24 


 


Troponin I  0.025  0.019  (0.000-0.034)  ng/mL








 Current Medications











Generic Name Dose Route Start Last Admin





  Trade Name Freq  PRN Reason Stop Dose Admin


 


Alprazolam  0.25 mg  04/25/18 15:39  





  Xanax  PO   





  TID PRN   





  Anxiety   


 


Escitalopram Oxalate  20 mg  04/26/18 09:00  04/26/18 08:57





  Lexapro  PO   20 mg





  DAILY RAJ   Administration


 


Famotidine  20 mg  04/25/18 21:00  04/26/18 08:57





  Pepcid  PO   20 mg





  BID RAJ   Administration


 


Metoprolol Tartrate  25 mg  04/25/18 21:00  04/26/18 08:56





  Lopressor  PO   25 mg





  BID RAJ   Administration


 


Miscellaneous Information  1 each  04/25/18 15:34  





  Rx Info: Iv Contrast Was Given  MISCELLANE  04/27/18 15:34  





  DAILY PRN   





  Per Protocol   


 


Naloxone HCl  0.2 mg  04/25/18 15:37  





  Narcan  IV   





  Q2M PRN   





  Opioid Reversal   


 


Ondansetron HCl  4 mg  04/25/18 15:37  





  Zofran  IVP   





  Q8HR PRN   





  Nausea And Vomiting   


 


Rivaroxaban  20 mg  04/26/18 17:30  





  Xarelto  PO   





  W/SUPPER RAJ   








 Intake and Output











 04/26/18 04/26/18 04/26/18





 06:59 14:59 22:59


 


Intake Total  222 


 


Balance  222 


 


Intake:   


 


  Oral  222 


 


Other:   


 


  # Voids 2 2 


 


  Weight 114.2 kg  








 





 04/25/18 13:50 





 04/25/18 13:50

## 2018-04-26 NOTE — P.CRDCN
History of Present Illness


Consult date: 04/26/18


Requesting physician: Shantel Juárez


Reason for Consult (text): 





SVT


Chief complaint: Palpitations


History of present illness: 


This is a pleasant 52-year-old  gentleman who follows regularly with 

Dr. Lopez in the office.  He has known history of hyperrlipidemia, prior 

episodes of SVT according to him, nonhypertensive, obesity,nondiabetic, 

nonsmoker.  He states that he got out of the shower yesterday, became quite 

diaphoretic and noticed his heart beating fast.  EMS was called, on arrival 

there patient was found to be in an SVT.  Patient was given adenosine and 

converted to normal sinus rhythm.  Since then he's remained in a normal sinus 

rhythm.  No further symptoms of palpitations.  Patient denies any chest 

discomfort, no difficulty in breathing.  EKG performed via EMS showed a 

supraventricular tachycardia, subsequent EKG performed here showed a normal 

sinus rhythm.  Chest x-ray does not reveal any acute cardiopulmonary process.  

TTA the chest negative for pulmonary embolism.  Hepatic steatosis and benign 

left adrenal gland lesion representing either lipid rich adenoma or 

myelolipoma.  Blood pressure 112/70 with a heart rate in the 90s, 94% on room 

air.  Patient did have a low-grade temperature of 100 last evening.  White 

blood cell count 11.4, hemoglobin 14.5, sodium 145, potassium 4.6, BUN 17, 

creatinine 1.0.  Troponins 0.047, 0.025, 0.019.  TSH is normal.  Patient states 

he does not drink significant amount of caffeinated beverages maybe has one 

cola day.  He does not drink alcohol.








Past Medical History


Past Medical History: Hyperlipidemia, Pneumonia, Pulmonary Embolus (PE)


Additional Past Medical History / Comment(s): Obesity, pulmonary embolism, 

hyperlipidemia, hypertriglyceridemia, fatty liver, acid reflux, svt.  pt stated 

he has had a pne vacine in past-unable to verify -dr office closed and [facility] 

no [facility].


History of Any Multi-Drug Resistant Organisms: None Reported


Past Surgical History: Cardiac Ablation, Heart Catheterization


Additional Past Surgical History / Comment(s): heart ablation


Past Anesthesia/Blood Transfusion Reactions: No Reported Reaction


Smoking Status: Former smoker





- Past Family History


  ** Mother


Family Medical History: Coronary Artery Disease (CAD)


Additional Family Medical History / Comment(s): CABG in 70's,  one brother 

healthy no sisters no.daughter has no sons





  ** Father


Family Medical History: No Reported History





Medications and Allergies


 Home Medications











 Medication  Instructions  Recorded  Confirmed  Type


 


Metoprolol Tartrate [Lopressor] 25 mg PO BID #60 tab 07/18/17 04/25/18 Rx


 


ALPRAZolam [Xanax] 0.25 mg PO TID PRN 04/25/18 04/25/18 History


 


Escitalopram [Lexapro] 20 mg PO DAILY 04/25/18 04/25/18 History


 


Rivaroxaban [Xarelto] 20 mg PO BID 04/25/18 04/25/18 History











 Allergies











Allergy/AdvReac Type Severity Reaction Status Date / Time


 


No Known Allergies Allergy   Verified 04/25/18 13:17














Physical Exam


Vitals: 


 Vital Signs











  Temp Pulse Pulse Resp BP BP BP


 


 04/26/18 04:00  97.8 F   94  18   112/73 


 


 04/26/18 00:00  100.0 F H   94  18   99/64  117/68


 


 04/25/18 20:00  99.3 F   96  18   119/74 


 


 04/25/18 17:35  97.8 F  93   18  100/68  


 


 04/25/18 16:24   90   18  117/77  


 


 04/25/18 14:50   88   18  106/69  


 


 04/25/18 13:12   102 H   18  124/80  














  Pulse Ox


 


 04/26/18 04:00  94 L


 


 04/26/18 00:00  95


 


 04/25/18 20:00  95


 


 04/25/18 17:35  97


 


 04/25/18 16:24  99


 


 04/25/18 14:50  98


 


 04/25/18 13:12  94 L








 Intake and Output











 04/25/18 04/26/18 04/26/18





 22:59 06:59 14:59


 


Intake Total 350  


 


Balance 350  


 


Intake:   


 


  Oral 350  


 


Other:   


 


  # Voids  2 


 


  Weight 113.398 kg 114.2 kg 














PHYSICAL EXAMINATION: 





HEENT: Head is atraumatic, normocephalic.  Pupils equal, round.  Neck is 

supple.  There is no elevated jugular venous pressure.





HEART EXAMINATION: Heart S1, S2 normal.  No murmur or gallop heard.





CHEST EXAMINATION: Lungs are clear to auscultation and precussion. No chest 

wall tenderness is noted on palpation or with deep breathing.





ABDOMEN:  Soft, obese, nontender. Bowel sounds are heard. No organomegaly noted.


 


EXTREMITIES: 2+ peripheral pulses with no evidence of peripheral edema and no 

calf tenderness noted.





NEUROLOGIC patient is awake, alert and oriented -3.


 


.


 








Results





 04/25/18 13:50





 04/25/18 13:50


 Cardiac Enzymes











  04/25/18 04/25/18 04/25/18 Range/Units





  13:50 13:50 20:25 


 


AST  50    (17-59)  U/L


 


Troponin I   0.047 H*  0.025  (0.000-0.034)  ng/mL














  04/26/18 Range/Units





  01:24 


 


AST   (17-59)  U/L


 


Troponin I  0.019  (0.000-0.034)  ng/mL








 Coagulation











  04/25/18 Range/Units





  13:50 


 


PT  11.4  (9.0-12.0)  sec


 


APTT  28.2  (22.0-30.0)  sec








 CBC











  04/25/18 Range/Units





  13:50 


 


WBC  11.4 H  (3.8-10.6)  k/uL


 


RBC  5.08  (4.30-5.90)  m/uL


 


Hgb  14.5  (13.0-17.5)  gm/dL


 


Hct  44.2  (39.0-53.0)  %


 


Plt Count  318  (150-450)  k/uL








 Comprehensive Metabolic Panel











  04/25/18 Range/Units





  13:50 


 


Sodium  145  (137-145)  mmol/L


 


Potassium  4.6  (3.5-5.1)  mmol/L


 


Chloride  107  ()  mmol/L


 


Carbon Dioxide  24  (22-30)  mmol/L


 


BUN  17  (9-20)  mg/dL


 


Creatinine  1.05  (0.66-1.25)  mg/dL


 


Glucose  98  (74-99)  mg/dL


 


Calcium  9.2  (8.4-10.2)  mg/dL


 


AST  50  (17-59)  U/L


 


ALT  71  (21-72)  U/L


 


Alkaline Phosphatase  55  ()  U/L


 


Total Protein  6.6  (6.3-8.2)  g/dL


 


Albumin  4.3  (3.5-5.0)  g/dL








 Current Medications











Generic Name Dose Route Start Last Admin





  Trade Name Freq  PRN Reason Stop Dose Admin


 


Alprazolam  0.25 mg  04/25/18 15:39  





  Xanax  PO   





  TID PRN   





  Anxiety   


 


Escitalopram Oxalate  20 mg  04/26/18 09:00  





  Lexapro  PO   





  DAILY RAJ   


 


Famotidine  20 mg  04/25/18 21:00  04/25/18 22:06





  Pepcid  PO   20 mg





  BID RAJ   Administration


 


Metoprolol Tartrate  25 mg  04/25/18 21:00  04/25/18 22:06





  Lopressor  PO   25 mg





  BID RAJ   Administration


 


Miscellaneous Information  1 each  04/25/18 15:34  





  Rx Info: Iv Contrast Was Given  MISCELLANE  04/27/18 15:34  





  DAILY PRN   





  Per Protocol   


 


Naloxone HCl  0.2 mg  04/25/18 15:37  





  Narcan  IV   





  Q2M PRN   





  Opioid Reversal   


 


Ondansetron HCl  4 mg  04/25/18 15:37  





  Zofran  IVP   





  Q8HR PRN   





  Nausea And Vomiting   


 


Rivaroxaban  20 mg  04/25/18 21:00  04/25/18 21:19





  Xarelto  PO   20 mg





  BID RAJ   Administration








 Intake and Output











 04/25/18 04/26/18 04/26/18





 22:59 06:59 14:59


 


Intake Total 350  


 


Balance 350  


 


Intake:   


 


  Oral 350  


 


Other:   


 


  # Voids  2 


 


  Weight 113.398 kg 114.2 kg 








 





 04/25/18 13:50 





 04/25/18 13:50 











EKG Interpretations (text)





Initial EKG showed a supraventricular tachycardia, subsequent EKG showed normal 

sinus rhythm.





Assessment and Plan


Plan: 


Assessment and plan


#1 supraventricular tachycardia, converted to normal sinus rhythm after 

adenosine.  Patient states he has had 2 prior episodes of supraventricular 

tachycardia.


#2 hyperlipidemia


#3 obesity


#4 history of PE on xarelto








Plan


We will obtain an echocardiogram with Doppler study.  We will also obtain Dr. Lopez's progress note from the office.  Continue Lopressor 25 mg one tablet by 

mouth twice a day Further recommendations to follow.





DNP note has been reviewed, I agree with a documented findings and plan of 

care.  Patient was seen and examined.

## 2018-04-26 NOTE — P.PN
Progress Note - Text


This is an addendum to the dictated cardiology consultation.  The patient 

presents with symptoms of palpitations and mild dizziness.  He was found to be 

in SVT and received IV adenosine with restoration of sinus mechanism.  He had 

similar presentation in the past.  His left ventricle systolic function in the 

past has been stable.  He has no history of obstructive CAD or CHF.  His 

activity level is stable and he has no PND, orthopnea or peripheral edema.


His physical examination shows clear lungs and he is in sinus mechanism with no 

peripheral edema.


His lab data revealed minimal troponin on the first sample subsequently normal.

  His EKG shows SVT and subsequently sinus mechanism.


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 

episodes.  I would recommend evaluation for ablation in view of his recurrence 

and young age.  He will be seen by Dr. Dumont and scheduled for the procedure 

electively if he is in agreement.  Thank you for this consult we will follow 

with you.

## 2018-04-26 NOTE — P.HPIM
History of Present Illness


H&P Date: 04/26/18


Chief Complaint: Palpitations





HISTORY AND PHYSICAL AND DISCHARGE SUMMARY: 








This is a 52-year-old  male patient of Dr. Bentley and Dr. Shearer with 

past medical history of hyperlipidemia, pulmonary embolism on Xarelto, fatty 

liver, gastroesophageal reflux disease and SVT.  Patient states he has never 

seen Dr. Dumont.  The patient states that yesterday while he was taking a 

shower he had palpitations.  He also had increased perspiration and he felt his 

gait was wobbly.  He denies having any chest pain or shortness of breath.  No 

nausea vomiting or diarrhea.  She denies any syncope episode but did feel a 

"little squeamish." Patient presented to [facility] emergency 

center by EMS and was found to be in SVT converted to normal sinus rhythm after 

IV adenosine.  White count was 11.4, electrolytes within normal limits, 

creatinine 1.05.  Troponin 0.047.  TSH 2.210.  Chest x-ray showed no 

pneumothorax, pleural effusion or focal pneumonia.  CTA of the chest showed no 

pulmonary embolism.  Resolution of previously seen multifocal PACs of the left 

pleural effusion.  Hepatic steatosis and benign left adrenal gland lesion 

representing either lipid rich adenoma or myelolipoma.  Patient was admitted to 

the selective care unit.  He has been seen by cardiology with recommendations 

to continue Lopressor 25 mg twice daily and echocardiogram.  They have also 

added in consult with Dr. Dumont.  Patient states he is feeling fine at this 

time of evaluation.  He denies any chest pain or shortness of breath.  He 

denies any palpitations.  Regarding his dose of Xarelto, patient states he has 

been on 20 g twice daily for a long time.  We will plan to change him to 20 g 

daily.  Patient has been cleared for discharge by cardiology, awaiting Dr. Dumont evaluation.  Patient will be discharged home today in stable condition.








Discharge Medication List


Metoprolol Tartrate [Lopressor] 25 mg PO BID #60 tab 07/18/17 [Rx]


ALPRAZolam [Xanax] 0.25 mg PO TID PRN 04/25/18 [History]


Escitalopram [Lexapro] 20 mg PO DAILY 04/25/18 [History]


Rivaroxaban [Xarelto] 20 mg PO DAILY #0 04/26/18 [Rx]





Review of Systems


All systems: negative


Constitutional: Denies chills, Denies fatigue, Denies fever, Denies lethargy, 

Denies malaise, Denies poor appetite, Denies sweats, Denies weakness


Eyes: denies blurred vision, denies pain


Ears, nose, mouth and throat: Denies headache, Denies sore throat, Denies 

vertigo


Cardiovascular: Reports palpitations, Denies chest pain, Denies decreased 

exercise tolerance, Denies dyspnea on exertion, Denies edema, Denies leg edema, 

Denies lightheadedness, Denies shortness of breath, Denies syncope


Respiratory: Denies cough, Denies cough with sputum, Denies dyspnea, Denies 

excessive sputum, Denies hemoptysis, Denies home oxygen, Denies wheezing


Gastrointestinal: Denies abdominal pain, Denies diarrhea, Denies nausea, Denies 

vomiting


Genitourinary: Denies dysuria


Musculoskeletal: Denies myalgias


Integumentary: Denies pruritus, Denies rash


Neurological: Denies numbness, Denies weakness


Psychiatric: Denies anxiety, Denies depression


Endocrine: Denies fatigue, Denies weight change





Past Medical History


Past Medical History: Hyperlipidemia, Pneumonia, Pulmonary Embolus (PE)


Additional Past Medical History / Comment(s): Obesity, pulmonary embolism, 

hyperlipidemia, hypertriglyceridemia, fatty liver, acid reflux, svt.  pt stated 

he has had a pne vacine in past-unable to verify -dr office closed and [facility] had 

no record.


History of Any Multi-Drug Resistant Organisms: None Reported


Past Surgical History: Cardiac Ablation, Heart Catheterization


Additional Past Surgical History / Comment(s): heart ablation


Past Anesthesia/Blood Transfusion Reactions: No Reported Reaction


Smoking Status: Former smoker





- Past Family History


  ** Mother


Family Medical History: Coronary Artery Disease (CAD)


Additional Family Medical History / Comment(s): CABG in 70's,  one brother 

healthy no sisters no.daughter has no sons





  ** Father


Family Medical History: No Reported History





Medications and Allergies


 Home Medications











 Medication  Instructions  Recorded  Confirmed  Type


 


Metoprolol Tartrate [Lopressor] 25 mg PO BID #60 tab 07/18/17 04/25/18 Rx


 


ALPRAZolam [Xanax] 0.25 mg PO TID PRN 04/25/18 04/25/18 History


 


Escitalopram [Lexapro] 20 mg PO DAILY 04/25/18 04/25/18 History


 


Rivaroxaban [Xarelto] 20 mg PO DAILY #0 04/26/18 04/25/18 Rx











 Allergies











Allergy/AdvReac Type Severity Reaction Status Date / Time


 


No Known Allergies Allergy   Verified 04/25/18 13:17














Physical Exam


Vitals: 


 Vital Signs











  Temp Pulse Pulse Resp BP BP BP


 


 04/26/18 09:00     16   


 


 04/26/18 08:58  98.4 F   98  16    124/71


 


 04/26/18 04:00  97.8 F   94  18   112/73 


 


 04/26/18 00:00  100.0 F H   94  18   99/64  117/68


 


 04/25/18 20:00  99.3 F   96  18   119/74 


 


 04/25/18 17:35  97.8 F  93   18  100/68  


 


 04/25/18 16:24   90   18  117/77  


 


 04/25/18 14:50   88   18  106/69  


 


 04/25/18 13:12   102 H   18  124/80  














  Pulse Ox


 


 04/26/18 09:00 


 


 04/26/18 08:58  94 L


 


 04/26/18 04:00  94 L


 


 04/26/18 00:00  95


 


 04/25/18 20:00  95


 


 04/25/18 17:35  97


 


 04/25/18 16:24  99


 


 04/25/18 14:50  98


 


 04/25/18 13:12  94 L








 Intake and Output











 04/25/18 04/26/18 04/26/18





 22:59 06:59 14:59


 


Intake Total 350  


 


Balance 350  


 


Intake:   


 


  Oral 350  


 


Other:   


 


  # Voids  2 2


 


  Weight 113.398 kg 114.2 kg 














General appearance: no average body habitus, cooperative, no disheveled, no 

mild distress, no morbidly obese, no acute distress, obese, no severe distress, 

no thin





- EENT


Eyes: no abnormal pupil, no anicteric sclerae, no disc margins sharp, no 

edentulous, no EOMI, no PERRLA, no fundus normal, no photophobia, no dentition 

normal, no poor dentition, no ptosis, no scleral icterus, normal appearance


ENT: no hard of hearing, no hearing grossly normal, no NA/AT, normal oropharynx

, no other, no pharyngeal erythema, no thrush, no tonsillar exudates, no 

tonsillar swelling


Ears: bilateral: normal





- Neck


Neck: no lymphadenopathy, normal ROM, no other, no rigidity, no stridor, no 

thyromegaly


Carotids: bilateral: upstroke normal


Thyroid: bilateral: normal size





- Respiratory


Respiratory: bilateral: CTA, diminished, dullness





- Cardiovascular


Rhythm: regular


Heart sounds: normal: S1, S2


Abnormal Heart Sounds: systolic murmur





- Gastrointestinal


General gastrointestinal: no absent bowel sounds, no decreased bowel sounds, no 

distended, no hepatomegaly, no hyperactive bowel sounds, normal bowel sounds, 

organomegaly, no rigid, no scaphoid, soft, no splenomegaly, no tenderness, no 

umbilical hernia, no ventral hernia





- Integumentary


Integumentary: no calor, no cellulitis, no cyanotic, no decreased turgor, no 

flushed, no jaundiced, normal, no normal turgor, no pale, rash, no ulcer





- Neurologic


Neurologic: CNII-XII intact





- Musculoskeletal


Musculoskeletal: gait normal, generalized weakness, strength equal bilaterally, 

no right sided weakness, no left sided weakness





- Psychiatric


Psychiatric: A&O x's 3, appropriate affect








Results


CBC & Chem 7: 


 04/25/18 13:50





 04/25/18 13:50


Labs: 


 Abnormal Lab Results - Last 24 Hours (Table)











  04/25/18 04/25/18 04/25/18 Range/Units





  13:50 13:50 13:50 


 


WBC  11.4 H    (3.8-10.6)  k/uL


 


Neutrophils #  8.2 H    (1.3-7.7)  k/uL


 


INR   1.2 H   (<1.2)  


 


Troponin I    0.047 H*  (0.000-0.034)  ng/mL














Thrombosis Risk Factor Assmnt





- DVT/VTE Prophylaxis


DVT/VTE Prophylaxis: Pharmacologic Prophylaxis ordered





Assessment and Plan


Plan: 





1.  Palpitations presenting with SVT and converted to normal sinus rhythm after 

adenosine.  Continue Lopressor 25 mg twice daily.  Echocardiogram ordered.  

Cardiology consult is appreciated.





2.  History of pulmonary embolism on Xarelto which will be continued.





3.  Recurrent depression and generalized anxiety disorder.  Continue Lexapro 20 

mg daily, Xanax or 0.25 mg 3 times daily as needed.





4.  GI prophylaxis.  Pepcid.





5.  DVT prophylaxis.  Xarelto.





6.  Hyperlipidemia.





Patient will be admitted to the hospital for a minimum of 1 night stay.





Discharge plan: Return home





Impression and plan of care have been directed as dictated by the signing 

physician.  Jodie Paulino nurse practitioner acting as scribe for signing 

physician.

## 2018-05-30 ENCOUNTER — HOSPITAL ENCOUNTER (OUTPATIENT)
Dept: HOSPITAL 47 - LABWHC1 | Age: 53
Discharge: HOME | End: 2018-05-30
Payer: COMMERCIAL

## 2018-05-30 DIAGNOSIS — R00.0: ICD-10-CM

## 2018-05-30 DIAGNOSIS — I42.1: Primary | ICD-10-CM

## 2018-05-30 DIAGNOSIS — I10: ICD-10-CM

## 2018-05-30 LAB
ANION GAP SERPL CALC-SCNC: 13 MMOL/L
BUN SERPL-SCNC: 12 MG/DL (ref 9–20)
CALCIUM SPEC-MCNC: 9.1 MG/DL (ref 8.4–10.2)
CHLORIDE SERPL-SCNC: 104 MMOL/L (ref 98–107)
CO2 SERPL-SCNC: 27 MMOL/L (ref 22–30)
ERYTHROCYTE [DISTWIDTH] IN BLOOD BY AUTOMATED COUNT: 4.71 M/UL (ref 4.3–5.9)
ERYTHROCYTE [DISTWIDTH] IN BLOOD: 12.9 % (ref 11.5–15.5)
GLUCOSE SERPL-MCNC: 86 MG/DL (ref 74–99)
HCT VFR BLD AUTO: 40.9 % (ref 39–53)
HGB BLD-MCNC: 14 GM/DL (ref 13–17.5)
MCH RBC QN AUTO: 29.7 PG (ref 25–35)
MCHC RBC AUTO-ENTMCNC: 34.3 G/DL (ref 31–37)
MCV RBC AUTO: 86.8 FL (ref 80–100)
PLATELET # BLD AUTO: 379 K/UL (ref 150–450)
POTASSIUM SERPL-SCNC: 4.3 MMOL/L (ref 3.5–5.1)
SODIUM SERPL-SCNC: 144 MMOL/L (ref 137–145)
WBC # BLD AUTO: 10.6 K/UL (ref 3.8–10.6)

## 2018-05-30 PROCEDURE — 85027 COMPLETE CBC AUTOMATED: CPT

## 2018-05-30 PROCEDURE — 80048 BASIC METABOLIC PNL TOTAL CA: CPT

## 2018-05-30 PROCEDURE — 36415 COLL VENOUS BLD VENIPUNCTURE: CPT

## 2018-06-04 ENCOUNTER — HOSPITAL ENCOUNTER (OUTPATIENT)
Dept: HOSPITAL 47 - CATHEP | Age: 53
LOS: 1 days | Discharge: HOME | End: 2018-06-05
Payer: COMMERCIAL

## 2018-06-04 VITALS — BODY MASS INDEX: 33.9 KG/M2

## 2018-06-04 VITALS — RESPIRATION RATE: 18 BRPM

## 2018-06-04 DIAGNOSIS — Z87.891: ICD-10-CM

## 2018-06-04 DIAGNOSIS — G47.33: ICD-10-CM

## 2018-06-04 DIAGNOSIS — Z82.49: ICD-10-CM

## 2018-06-04 DIAGNOSIS — E78.5: ICD-10-CM

## 2018-06-04 DIAGNOSIS — E66.9: ICD-10-CM

## 2018-06-04 DIAGNOSIS — I47.1: Primary | ICD-10-CM

## 2018-06-04 DIAGNOSIS — I44.2: ICD-10-CM

## 2018-06-04 DIAGNOSIS — I25.2: ICD-10-CM

## 2018-06-04 DIAGNOSIS — Z79.01: ICD-10-CM

## 2018-06-04 DIAGNOSIS — Z87.01: ICD-10-CM

## 2018-06-04 DIAGNOSIS — I10: ICD-10-CM

## 2018-06-04 DIAGNOSIS — Z79.899: ICD-10-CM

## 2018-06-04 DIAGNOSIS — Z86.711: ICD-10-CM

## 2018-06-04 PROCEDURE — 93623 PRGRMD STIMJ&PACG IV RX NFS: CPT

## 2018-06-04 PROCEDURE — 93613 INTRACARDIAC EPHYS 3D MAPG: CPT

## 2018-06-04 PROCEDURE — 93653 COMPRE EP EVAL TX SVT: CPT

## 2018-06-04 RX ADMIN — POTASSIUM CHLORIDE SCH: 14.9 INJECTION, SOLUTION INTRAVENOUS at 16:11

## 2018-06-04 RX ADMIN — POTASSIUM CHLORIDE SCH: 14.9 INJECTION, SOLUTION INTRAVENOUS at 16:13

## 2018-06-04 RX ADMIN — SODIUM CHLORIDE, PRESERVATIVE FREE SCH ML: 5 INJECTION INTRAVENOUS at 20:33

## 2018-06-04 RX ADMIN — CEFAZOLIN SCH MLS: 330 INJECTION, POWDER, FOR SOLUTION INTRAMUSCULAR; INTRAVENOUS at 10:59

## 2018-06-04 RX ADMIN — CEFAZOLIN SCH: 330 INJECTION, POWDER, FOR SOLUTION INTRAMUSCULAR; INTRAVENOUS at 15:37

## 2018-06-04 RX ADMIN — LIDOCAINE HYDROCHLORIDE ONE ML: 20 INJECTION, SOLUTION INFILTRATION; PERINEURAL at 12:46

## 2018-06-04 RX ADMIN — CEFAZOLIN SCH: 330 INJECTION, POWDER, FOR SOLUTION INTRAMUSCULAR; INTRAVENOUS at 16:12

## 2018-06-04 RX ADMIN — LIDOCAINE HYDROCHLORIDE ONE ML: 20 INJECTION, SOLUTION INFILTRATION; PERINEURAL at 12:47

## 2018-06-04 NOTE — CE
CARDIAC ELECTROPHYSIOLOGY REPORT



Main Hilton is a 52-year-old male patient who was referred for evaluation and

management of recurrent supraventricular tachycardia.  This is drug refractory. His

tachycardia was greater than 200 beats. It was about 214 beats per minute when he was

admitted in the month of April at [facility].



PROCEDURE:

Patient brought to the EP lab in a fasting state. Written informed consent was obtained

prior to the procedure.  The left shoulder area was prepped and draped as per protocol.

1% lidocaine was used for local anesthesia.  Venous sheaths were placed in the right

and left femoral veins for a total of 4 sheaths.  Diagnostic catheters were placed in

the right heart (high right atrial catheter, His bundle catheter, RV catheter and

coronary sinus catheter).



Baseline measurements were as follows: Sinus cycle length 590 milliseconds, NC interval

135 milliseconds,  milliseconds, and  milliseconds.  The AH interval 54

milliseconds, HV interval 44 milliseconds.



With the slightest stimulation, SVT was very easily inducible.  This SVT at a cycle

length of 335 milliseconds, narrow QRS with short septal time of 19 milliseconds

consistent with AV salud reentry.  RV pacing was performed. Post pacing interval was

long and the post pacing interval minus tachycardia cycle length was 213 milliseconds.

Later a salud response was noted with para Hisian pacing.



VA Wenckebach block 270 milliseconds.



When mapping was performed, the His was found to be extremely superficial and while

mapping the fast pathway area complete heart block was noted simply with gentle

mechanical contact, but this recovered completely and expeditiously.



The slow pathway was mapped.  RF ablation was applied. A few junctional beats were

noted and the slow pathway was then completely eliminated.  The fast pathway remained

completely intact and the post ablation NC interval was about 135-140 milliseconds

similar to the baseline measurements.



Following that high-dose Isuprel was used and atrial ventricular pacing was performed.

Two sites for atrial pacing were performed.  Straight pacing was performed.

Ventricular extra stimulation up to triple extra stimuli were performed.  No SVT was

induced.  No other arrhythmias induced.



At the end of the procedure all catheters removed.  The patient was transferred back to

telemetry.



RESULT:

Successful mapping and ablation of the slow pathway for management of typical AV

reentrant tachycardia.



PLAN:

Discontinue metoprolol and observe thereafter.





MMODL / IJN: [id number] / Job#: [id number]

## 2018-06-05 VITALS — SYSTOLIC BLOOD PRESSURE: 128 MMHG | DIASTOLIC BLOOD PRESSURE: 77 MMHG | HEART RATE: 102 BPM | TEMPERATURE: 99 F

## 2018-06-05 RX ADMIN — SODIUM CHLORIDE, PRESERVATIVE FREE SCH ML: 5 INJECTION INTRAVENOUS at 07:50

## 2018-06-05 NOTE — P.DS
Providers


Attending physician: 


Beto Dumont





Primary care physician: 


[facility] Course: 





Patient is doing well.  He denies any palpitations dizziness current problems 

chest pain pleuritic chest pain shortness of breath or cough


On examination his groins have healed well there is no hematoma there is 

minimal tenderness


Blood pressure 126/83 minutes of mercury pulse rate 9200 beats a minute afebrile


Normal heart sounds normal S1 normal S2


Breath sounds are clear no rhonchi no crackles


Abdomen soft nontender


No hematoma in both groins





Impression


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


Status post successful ablation


Tachycardia rendered noninducible





No arrhythmias on telemetry now bradycardia with is normal NV interval 

postprocedure''





Plan


Discharge home and follow with Dr. Lopez in the next 2 weeks








Patient Condition at Discharge: Stable





Plan - Discharge Summary


Discharge Rx Participant: Yes


New Discharge Prescriptions: 


Discontinued


   Metoprolol Tartrate [Lopressor] 50 mg PO BID #60 tab





No Action


   RX: Escitalopram [Lexapro] 20 mg PO DAILY


   RX: ALPRAZolam [Xanax] 0.25 mg PO TID PRN


     PRN Reason: Anxiety


   RX: Rivaroxaban [Xarelto] 20 mg PO DAILY #0


Discharge Medication List





RX: ALPRAZolam [Xanax] 0.25 mg PO TID PRN 04/25/18 [History]


RX: Escitalopram [Lexapro] 20 mg PO DAILY 04/25/18 [History]


RX: Rivaroxaban [Xarelto] 20 mg PO DAILY #0 04/26/18 [Rx]








Follow up Appointment(s)/Referral(s): 


Kalyan Shearer MD [STAFF PHYSICIAN] - 06/15/18 3:45 pm (Friday)


Patient Instructions/Handouts:  Cardiac Ablation (DC)


Activity/Diet/Wound Care/Special Instructions: 


Post EP study - Ablation instructions





1.  Keep access sites dry for 2 days.


2.  No heavy lifting or straining for 2 days.


3.  Avoid bending the hips repeatedly for 2 days.


4.  You may go up and down stairs slowly  





Call if the following is noted





1.  Bleeding, increasing swelling or pain at the access sites.


2.  Increasing chest discomfort, especially upon taking a deep breath.


3.  Increasing shortness of breath, at rest or with exertion.


4.  Undue cough / phlegm


5.  Difficulty or pain while swallowing.


6.  Pain or change in color in the extremities.


7.  Fever, chills, rigors.


8.  Increasing headache or neurologic symptoms.


9.  Dizziness, fainting, palpitations





Stop metoprolol


Follow Dr. Shearer in 4 weeks


Discharge Disposition: HOME SELF-CARE

## 2021-03-08 ENCOUNTER — HOSPITAL ENCOUNTER (EMERGENCY)
Dept: HOSPITAL 47 - EC | Age: 56
Discharge: HOME | End: 2021-03-08
Payer: COMMERCIAL

## 2021-03-08 VITALS
SYSTOLIC BLOOD PRESSURE: 148 MMHG | HEART RATE: 98 BPM | TEMPERATURE: 98.5 F | DIASTOLIC BLOOD PRESSURE: 92 MMHG | RESPIRATION RATE: 18 BRPM

## 2021-03-08 DIAGNOSIS — F41.9: ICD-10-CM

## 2021-03-08 DIAGNOSIS — Z79.899: ICD-10-CM

## 2021-03-08 DIAGNOSIS — Z86.711: ICD-10-CM

## 2021-03-08 DIAGNOSIS — F41.0: Primary | ICD-10-CM

## 2021-03-08 LAB
ALBUMIN SERPL-MCNC: 4.9 G/DL (ref 3.5–5)
ALP SERPL-CCNC: 61 U/L (ref 38–126)
ALT SERPL-CCNC: 34 U/L (ref 4–49)
ANION GAP SERPL CALC-SCNC: 11 MMOL/L
APTT BLD: 24.1 SEC (ref 22–30)
AST SERPL-CCNC: 36 U/L (ref 17–59)
BASOPHILS # BLD AUTO: 0.1 K/UL (ref 0–0.2)
BASOPHILS NFR BLD AUTO: 1 %
BUN SERPL-SCNC: 13 MG/DL (ref 9–20)
CALCIUM SPEC-MCNC: 9.5 MG/DL (ref 8.4–10.2)
CHLORIDE SERPL-SCNC: 104 MMOL/L (ref 98–107)
CO2 SERPL-SCNC: 22 MMOL/L (ref 22–30)
EOSINOPHIL # BLD AUTO: 0.1 K/UL (ref 0–0.7)
EOSINOPHIL NFR BLD AUTO: 1 %
ERYTHROCYTE [DISTWIDTH] IN BLOOD BY AUTOMATED COUNT: 5.66 M/UL (ref 4.3–5.9)
ERYTHROCYTE [DISTWIDTH] IN BLOOD: 12 % (ref 11.5–15.5)
GLUCOSE SERPL-MCNC: 101 MG/DL (ref 74–99)
HCT VFR BLD AUTO: 49.7 % (ref 39–53)
HGB BLD-MCNC: 16.9 GM/DL (ref 13–17.5)
INR PPP: 1 (ref ?–1.2)
LYMPHOCYTES # SPEC AUTO: 2 K/UL (ref 1–4.8)
LYMPHOCYTES NFR SPEC AUTO: 23 %
MAGNESIUM SPEC-SCNC: 2.2 MG/DL (ref 1.6–2.3)
MCH RBC QN AUTO: 29.9 PG (ref 25–35)
MCHC RBC AUTO-ENTMCNC: 34 G/DL (ref 31–37)
MCV RBC AUTO: 87.8 FL (ref 80–100)
MONOCYTES # BLD AUTO: 0.4 K/UL (ref 0–1)
MONOCYTES NFR BLD AUTO: 5 %
NEUTROPHILS # BLD AUTO: 6 K/UL (ref 1.3–7.7)
NEUTROPHILS NFR BLD AUTO: 69 %
PLATELET # BLD AUTO: 276 K/UL (ref 150–450)
POTASSIUM SERPL-SCNC: 4.8 MMOL/L (ref 3.5–5.1)
PROT SERPL-MCNC: 8.1 G/DL (ref 6.3–8.2)
PT BLD: 11 SEC (ref 9–12)
SODIUM SERPL-SCNC: 137 MMOL/L (ref 137–145)
WBC # BLD AUTO: 8.7 K/UL (ref 3.8–10.6)

## 2021-03-08 PROCEDURE — 80053 COMPREHEN METABOLIC PANEL: CPT

## 2021-03-08 PROCEDURE — 80320 DRUG SCREEN QUANTALCOHOLS: CPT

## 2021-03-08 PROCEDURE — 99284 EMERGENCY DEPT VISIT MOD MDM: CPT

## 2021-03-08 PROCEDURE — 84484 ASSAY OF TROPONIN QUANT: CPT

## 2021-03-08 PROCEDURE — 96374 THER/PROPH/DIAG INJ IV PUSH: CPT

## 2021-03-08 PROCEDURE — 84443 ASSAY THYROID STIM HORMONE: CPT

## 2021-03-08 PROCEDURE — 85730 THROMBOPLASTIN TIME PARTIAL: CPT

## 2021-03-08 PROCEDURE — 85025 COMPLETE CBC W/AUTO DIFF WBC: CPT

## 2021-03-08 PROCEDURE — 83735 ASSAY OF MAGNESIUM: CPT

## 2021-03-08 PROCEDURE — 80306 DRUG TEST PRSMV INSTRMNT: CPT

## 2021-03-08 PROCEDURE — 36415 COLL VENOUS BLD VENIPUNCTURE: CPT

## 2021-03-08 PROCEDURE — 85610 PROTHROMBIN TIME: CPT

## 2021-03-08 PROCEDURE — 93005 ELECTROCARDIOGRAM TRACING: CPT

## 2021-03-08 NOTE — ED
General Adult HPI





- General


Chief complaint: Anxiety


Stated complaint: panic attacks


Time Seen by Provider: 03/08/21 19:55


Source: patient, RN notes reviewed


Mode of arrival: ambulatory


Limitations: no limitations





- History of Present Illness


Initial comments: 





This a 55-year-old male presents emergency from with family chief complaint of 

severe anxiety panic attack, upper back pain, with swings.  Patient states that 

she developed anxiety panic takes a few years ago has not had one in a while 

states he became very angry at her telemarketing on the phone states is unsure 

what his happened.  Patient states that to being very tight in his upper back d

enies any chest pain or abdominal pain no low back pain.  Patient states took a 

Xanax on for p.m. which seemed to help some.  Patient states still for some 

tightness.  Mother states that he is worried about his severe mood swings at 

this time.  Denies any shortness breath headache dizziness no other complaints.





- Related Data


                                Home Medications











 Medication  Instructions  Recorded  Confirmed


 


ALPRAZolam [Xanax] 0.25 mg PO TID PRN 04/25/18 06/04/18


 


Escitalopram [Lexapro] 20 mg PO DAILY 04/25/18 06/04/18








                                  Previous Rx's











 Medication  Instructions  Recorded


 


Rivaroxaban [Xarelto] 20 mg PO DAILY #0 04/26/18











                                    Allergies











Allergy/AdvReac Type Severity Reaction Status Date / Time


 


No Known Allergies Allergy   Verified 03/08/21 17:43














Review of Systems


ROS Statement: 


Those systems with pertinent positive or pertinent negative responses have been 

documented in the HPI.





ROS Other: All systems not noted in ROS Statement are negative.





Past Medical History


Past Medical History: GERD/Reflux, Hyperlipidemia, Pulmonary Embolus (PE)


Additional Past Medical History / Comment(s): See Dr Dumont's H&P; Hx pneumonia


History of Any Multi-Drug Resistant Organisms: None Reported


Past Surgical History: Cardiac Ablation, Heart Catheterization


Additional Past Surgical History / Comment(s): heart ablation


Past Anesthesia/Blood Transfusion Reactions: No Reported Reaction


Past Psychological History: No Psychological Hx Reported


Smoking Status: Never smoker


Past Alcohol Use History: None Reported, Occasional


Past Drug Use History: None Reported





- Past Family History


  ** Mother


Family Medical History: Coronary Artery Disease (CAD)


Additional Family Medical History / Comment(s): CABG in 70's,  one brother 

healthy no sisters no.daughter has no sons





  ** Father


Family Medical History: No Reported History





General Exam


Limitations: no limitations


General appearance: alert, in no apparent distress


Head exam: Present: atraumatic, normocephalic, normal inspection


Eye exam: Present: normal appearance, PERRL, EOMI.  Absent: scleral icterus, 

conjunctival injection, periorbital swelling


ENT exam: Present: normal exam, normal oropharynx, mucous membranes moist


Neck exam: Present: normal inspection, full ROM.  Absent: tenderness, 

meningismus, lymphadenopathy


Respiratory exam: Present: normal lung sounds bilaterally.  Absent: respiratory 

distress, wheezes, rales, rhonchi, stridor


Cardiovascular Exam: Present: regular rate, normal rhythm, normal heart sounds. 

 Absent: systolic murmur, diastolic murmur, rubs, gallop, clicks


GI/Abdominal exam: Present: soft, normal bowel sounds.  Absent: distended, 

tenderness, guarding, rebound, rigid


Back exam: Present: full ROM.  Absent: tenderness, paraspinal tenderness, ve

rtebral tenderness


Neurological exam: Present: alert, oriented X3, CN II-XII intact, reflexes 

normal.  Absent: motor sensory deficit


Psychiatric exam: Present: anxious


Skin exam: Present: warm, dry, intact, normal color.  Absent: rash





Course


                                   Vital Signs











  03/08/21





  17:40


 


Temperature 98.5 F


 


Pulse Rate 98


 


Respiratory 18





Rate 


 


Blood Pressure 148/92


 


O2 Sat by Pulse 96





Oximetry 














EKG Findings





- EKG Comments:


EKG Findings:: EKG performed at 20:37 normal sinus rhythm rate of 96  QRS 

94 QTC is /447





Medical Decision Making





- Medical Decision Making





Patient was medically cleared workup is negative this time.  Patient had panic 

attack.  Patient cleared  by EPS secondary to his mood issues in which she feels

 better and is stable for discharge.





- Lab Data


Result diagrams: 


                                 03/08/21 20:28





                                 03/08/21 20:28


                                   Lab Results











  03/08/21 03/08/21 03/08/21 Range/Units





  20:28 20:28 20:28 


 


WBC  8.7    (3.8-10.6)  k/uL


 


RBC  5.66    (4.30-5.90)  m/uL


 


Hgb  16.9    (13.0-17.5)  gm/dL


 


Hct  49.7    (39.0-53.0)  %


 


MCV  87.8    (80.0-100.0)  fL


 


MCH  29.9    (25.0-35.0)  pg


 


MCHC  34.0    (31.0-37.0)  g/dL


 


RDW  12.0    (11.5-15.5)  %


 


Plt Count  276    (150-450)  k/uL


 


MPV  6.6    


 


Neutrophils %  69    %


 


Lymphocytes %  23    %


 


Monocytes %  5    %


 


Eosinophils %  1    %


 


Basophils %  1    %


 


Neutrophils #  6.0    (1.3-7.7)  k/uL


 


Lymphocytes #  2.0    (1.0-4.8)  k/uL


 


Monocytes #  0.4    (0-1.0)  k/uL


 


Eosinophils #  0.1    (0-0.7)  k/uL


 


Basophils #  0.1    (0-0.2)  k/uL


 


PT   11.0   (9.0-12.0)  sec


 


INR   1.0   (<1.2)  


 


APTT   24.1   (22.0-30.0)  sec


 


Sodium    137  (137-145)  mmol/L


 


Potassium    4.8  (3.5-5.1)  mmol/L


 


Chloride    104  ()  mmol/L


 


Carbon Dioxide    22  (22-30)  mmol/L


 


Anion Gap    11  mmol/L


 


BUN    13  (9-20)  mg/dL


 


Creatinine    0.70  (0.66-1.25)  mg/dL


 


Est GFR (CKD-EPI)AfAm    >90  (>60 ml/min/1.73 sqM)  


 


Est GFR (CKD-EPI)NonAf    >90  (>60 ml/min/1.73 sqM)  


 


Glucose    101 H  (74-99)  mg/dL


 


Calcium    9.5  (8.4-10.2)  mg/dL


 


Magnesium    2.2  (1.6-2.3)  mg/dL


 


Total Bilirubin    1.0  (0.2-1.3)  mg/dL


 


AST    36  (17-59)  U/L


 


ALT    34  (4-49)  U/L


 


Alkaline Phosphatase    61  ()  U/L


 


Troponin I     (0.000-0.034)  ng/mL


 


Total Protein    8.1  (6.3-8.2)  g/dL


 


Albumin    4.9  (3.5-5.0)  g/dL


 


TSH    2.640  (0.465-4.680)  mIU/L


 


Urine Opiates Screen     (NotDetected)  


 


Ur Oxycodone Screen     (NotDetected)  


 


Urine Methadone Screen     (NotDetected)  


 


Ur Propoxyphene Screen     (NotDetected)  


 


Ur Barbiturates Screen     (NotDetected)  


 


U Tricyclic Antidepress     (NotDetected)  


 


Ur Phencyclidine Scrn     (NotDetected)  


 


Ur Amphetamines Screen     (NotDetected)  


 


U Methamphetamines Scrn     (NotDetected)  


 


U Benzodiazepines Scrn     (NotDetected)  


 


Urine Cocaine Screen     (NotDetected)  


 


U Marijuana (THC) Screen     (NotDetected)  


 


Serum Alcohol    <10  mg/dL














  03/08/21 03/08/21 Range/Units





  20:28 20:28 


 


WBC    (3.8-10.6)  k/uL


 


RBC    (4.30-5.90)  m/uL


 


Hgb    (13.0-17.5)  gm/dL


 


Hct    (39.0-53.0)  %


 


MCV    (80.0-100.0)  fL


 


MCH    (25.0-35.0)  pg


 


MCHC    (31.0-37.0)  g/dL


 


RDW    (11.5-15.5)  %


 


Plt Count    (150-450)  k/uL


 


MPV    


 


Neutrophils %    %


 


Lymphocytes %    %


 


Monocytes %    %


 


Eosinophils %    %


 


Basophils %    %


 


Neutrophils #    (1.3-7.7)  k/uL


 


Lymphocytes #    (1.0-4.8)  k/uL


 


Monocytes #    (0-1.0)  k/uL


 


Eosinophils #    (0-0.7)  k/uL


 


Basophils #    (0-0.2)  k/uL


 


PT    (9.0-12.0)  sec


 


INR    (<1.2)  


 


APTT    (22.0-30.0)  sec


 


Sodium    (137-145)  mmol/L


 


Potassium    (3.5-5.1)  mmol/L


 


Chloride    ()  mmol/L


 


Carbon Dioxide    (22-30)  mmol/L


 


Anion Gap    mmol/L


 


BUN    (9-20)  mg/dL


 


Creatinine    (0.66-1.25)  mg/dL


 


Est GFR (CKD-EPI)AfAm    (>60 ml/min/1.73 sqM)  


 


Est GFR (CKD-EPI)NonAf    (>60 ml/min/1.73 sqM)  


 


Glucose    (74-99)  mg/dL


 


Calcium    (8.4-10.2)  mg/dL


 


Magnesium    (1.6-2.3)  mg/dL


 


Total Bilirubin    (0.2-1.3)  mg/dL


 


AST    (17-59)  U/L


 


ALT    (4-49)  U/L


 


Alkaline Phosphatase    ()  U/L


 


Troponin I  <0.012   (0.000-0.034)  ng/mL


 


Total Protein    (6.3-8.2)  g/dL


 


Albumin    (3.5-5.0)  g/dL


 


TSH    (0.465-4.680)  mIU/L


 


Urine Opiates Screen   Not Detected  (NotDetected)  


 


Ur Oxycodone Screen   Not Detected  (NotDetected)  


 


Urine Methadone Screen   Not Detected  (NotDetected)  


 


Ur Propoxyphene Screen   Not Detected  (NotDetected)  


 


Ur Barbiturates Screen   Not Detected  (NotDetected)  


 


U Tricyclic Antidepress   Not Detected  (NotDetected)  


 


Ur Phencyclidine Scrn   Not Detected  (NotDetected)  


 


Ur Amphetamines Screen   Not Detected  (NotDetected)  


 


U Methamphetamines Scrn   Not Detected  (NotDetected)  


 


U Benzodiazepines Scrn   Detected H  (NotDetected)  


 


Urine Cocaine Screen   Not Detected  (NotDetected)  


 


U Marijuana (THC) Screen   Not Detected  (NotDetected)  


 


Serum Alcohol    mg/dL














Disposition


Clinical Impression: 


 Panic attack, Acute anxiety





Disposition: HOME SELF-CARE


Instructions (If sedation given, give patient instructions):  Generalized 

Anxiety Disorder (ED)


Additional Instructions: 


Please return to the Emergency Department if symptoms worsen or any other 

concerns.


Is patient prescribed a controlled substance at d/c from ED?: No


Referrals: 


Luis Felipe Bentley MD [Primary Care Provider] - 1-2 days


Time of Disposition: 22:57

## 2021-05-08 ENCOUNTER — HOSPITAL ENCOUNTER (OUTPATIENT)
Dept: HOSPITAL 47 - EC | Age: 56
Setting detail: OBSERVATION
LOS: 2 days | Discharge: HOME | End: 2021-05-10
Attending: INTERNAL MEDICINE | Admitting: INTERNAL MEDICINE
Payer: COMMERCIAL

## 2021-05-08 DIAGNOSIS — E86.0: ICD-10-CM

## 2021-05-08 DIAGNOSIS — I48.91: ICD-10-CM

## 2021-05-08 DIAGNOSIS — Z79.01: ICD-10-CM

## 2021-05-08 DIAGNOSIS — F41.9: ICD-10-CM

## 2021-05-08 DIAGNOSIS — I34.0: ICD-10-CM

## 2021-05-08 DIAGNOSIS — Z20.822: ICD-10-CM

## 2021-05-08 DIAGNOSIS — G47.00: ICD-10-CM

## 2021-05-08 DIAGNOSIS — Z98.890: ICD-10-CM

## 2021-05-08 DIAGNOSIS — K85.90: ICD-10-CM

## 2021-05-08 DIAGNOSIS — R07.89: ICD-10-CM

## 2021-05-08 DIAGNOSIS — E78.5: ICD-10-CM

## 2021-05-08 DIAGNOSIS — Z79.899: ICD-10-CM

## 2021-05-08 DIAGNOSIS — R55: Primary | ICD-10-CM

## 2021-05-08 DIAGNOSIS — Z82.49: ICD-10-CM

## 2021-05-08 DIAGNOSIS — Z86.79: ICD-10-CM

## 2021-05-08 DIAGNOSIS — K21.9: ICD-10-CM

## 2021-05-08 DIAGNOSIS — R04.0: ICD-10-CM

## 2021-05-08 DIAGNOSIS — Z87.01: ICD-10-CM

## 2021-05-08 DIAGNOSIS — Z86.711: ICD-10-CM

## 2021-05-08 LAB
ALBUMIN SERPL-MCNC: 3.2 G/DL (ref 3.5–5)
ALBUMIN SERPL-MCNC: 3.8 G/DL (ref 3.5–5)
ALP SERPL-CCNC: 47 U/L (ref 38–126)
ALP SERPL-CCNC: 60 U/L (ref 38–126)
ALT SERPL-CCNC: 23 U/L (ref 4–49)
ALT SERPL-CCNC: 26 U/L (ref 4–49)
ANION GAP SERPL CALC-SCNC: 2 MMOL/L
ANION GAP SERPL CALC-SCNC: 7 MMOL/L
APTT BLD: 22.2 SEC (ref 22–30)
AST SERPL-CCNC: 18 U/L (ref 17–59)
AST SERPL-CCNC: 21 U/L (ref 17–59)
BASOPHILS # BLD AUTO: 0 K/UL (ref 0–0.2)
BASOPHILS # BLD AUTO: 0 K/UL (ref 0–0.2)
BASOPHILS NFR BLD AUTO: 1 %
BASOPHILS NFR BLD AUTO: 1 %
BUN SERPL-SCNC: 14 MG/DL (ref 9–20)
BUN SERPL-SCNC: 17 MG/DL (ref 9–20)
CALCIUM SPEC-MCNC: 8.2 MG/DL (ref 8.4–10.2)
CALCIUM SPEC-MCNC: 9.1 MG/DL (ref 8.4–10.2)
CHLORIDE SERPL-SCNC: 105 MMOL/L (ref 98–107)
CHLORIDE SERPL-SCNC: 109 MMOL/L (ref 98–107)
CK SERPL-CCNC: 64 U/L (ref 55–170)
CO2 SERPL-SCNC: 25 MMOL/L (ref 22–30)
CO2 SERPL-SCNC: 26 MMOL/L (ref 22–30)
EOSINOPHIL # BLD AUTO: 0.1 K/UL (ref 0–0.7)
EOSINOPHIL # BLD AUTO: 0.1 K/UL (ref 0–0.7)
EOSINOPHIL NFR BLD AUTO: 1 %
EOSINOPHIL NFR BLD AUTO: 1 %
ERYTHROCYTE [DISTWIDTH] IN BLOOD BY AUTOMATED COUNT: 4.56 M/UL (ref 4.3–5.9)
ERYTHROCYTE [DISTWIDTH] IN BLOOD BY AUTOMATED COUNT: 5 M/UL (ref 4.3–5.9)
ERYTHROCYTE [DISTWIDTH] IN BLOOD: 12.5 % (ref 11.5–15.5)
ERYTHROCYTE [DISTWIDTH] IN BLOOD: 12.5 % (ref 11.5–15.5)
GLUCOSE SERPL-MCNC: 112 MG/DL (ref 74–99)
GLUCOSE SERPL-MCNC: 99 MG/DL (ref 74–99)
HCT VFR BLD AUTO: 40.5 % (ref 39–53)
HCT VFR BLD AUTO: 43.5 % (ref 39–53)
HGB BLD-MCNC: 14.1 GM/DL (ref 13–17.5)
HGB BLD-MCNC: 15.4 GM/DL (ref 13–17.5)
INR PPP: 1 (ref ?–1.2)
LIPASE SERPL-CCNC: 1226 U/L (ref 23–300)
LIPASE SERPL-CCNC: 973 U/L (ref 23–300)
LYMPHOCYTES # SPEC AUTO: 1.4 K/UL (ref 1–4.8)
LYMPHOCYTES # SPEC AUTO: 2.1 K/UL (ref 1–4.8)
LYMPHOCYTES NFR SPEC AUTO: 20 %
LYMPHOCYTES NFR SPEC AUTO: 28 %
MAGNESIUM SPEC-SCNC: 2.4 MG/DL (ref 1.6–2.3)
MCH RBC QN AUTO: 30.7 PG (ref 25–35)
MCH RBC QN AUTO: 31 PG (ref 25–35)
MCHC RBC AUTO-ENTMCNC: 34.9 G/DL (ref 31–37)
MCHC RBC AUTO-ENTMCNC: 35.3 G/DL (ref 31–37)
MCV RBC AUTO: 87.1 FL (ref 80–100)
MCV RBC AUTO: 88.7 FL (ref 80–100)
MONOCYTES # BLD AUTO: 0.4 K/UL (ref 0–1)
MONOCYTES # BLD AUTO: 0.5 K/UL (ref 0–1)
MONOCYTES NFR BLD AUTO: 5 %
MONOCYTES NFR BLD AUTO: 7 %
NEUTROPHILS # BLD AUTO: 4.7 K/UL (ref 1.3–7.7)
NEUTROPHILS # BLD AUTO: 5.2 K/UL (ref 1.3–7.7)
NEUTROPHILS NFR BLD AUTO: 62 %
NEUTROPHILS NFR BLD AUTO: 72 %
PLATELET # BLD AUTO: 231 K/UL (ref 150–450)
PLATELET # BLD AUTO: 277 K/UL (ref 150–450)
POTASSIUM SERPL-SCNC: 3.9 MMOL/L (ref 3.5–5.1)
POTASSIUM SERPL-SCNC: 4.1 MMOL/L (ref 3.5–5.1)
PROT SERPL-MCNC: 5.6 G/DL (ref 6.3–8.2)
PROT SERPL-MCNC: 6.4 G/DL (ref 6.3–8.2)
PT BLD: 10.5 SEC (ref 9–12)
SODIUM SERPL-SCNC: 137 MMOL/L (ref 137–145)
SODIUM SERPL-SCNC: 137 MMOL/L (ref 137–145)
WBC # BLD AUTO: 7.2 K/UL (ref 3.8–10.6)
WBC # BLD AUTO: 7.5 K/UL (ref 3.8–10.6)

## 2021-05-08 PROCEDURE — 80061 LIPID PANEL: CPT

## 2021-05-08 PROCEDURE — 85730 THROMBOPLASTIN TIME PARTIAL: CPT

## 2021-05-08 PROCEDURE — 71275 CT ANGIOGRAPHY CHEST: CPT

## 2021-05-08 PROCEDURE — 87635 SARS-COV-2 COVID-19 AMP PRB: CPT

## 2021-05-08 PROCEDURE — 96374 THER/PROPH/DIAG INJ IV PUSH: CPT

## 2021-05-08 PROCEDURE — 85025 COMPLETE CBC W/AUTO DIFF WBC: CPT

## 2021-05-08 PROCEDURE — 36415 COLL VENOUS BLD VENIPUNCTURE: CPT

## 2021-05-08 PROCEDURE — 83690 ASSAY OF LIPASE: CPT

## 2021-05-08 PROCEDURE — 76705 ECHO EXAM OF ABDOMEN: CPT

## 2021-05-08 PROCEDURE — 85610 PROTHROMBIN TIME: CPT

## 2021-05-08 PROCEDURE — 84484 ASSAY OF TROPONIN QUANT: CPT

## 2021-05-08 PROCEDURE — 83880 ASSAY OF NATRIURETIC PEPTIDE: CPT

## 2021-05-08 PROCEDURE — 93270 REMOTE 30 DAY ECG REV/REPORT: CPT

## 2021-05-08 PROCEDURE — 96361 HYDRATE IV INFUSION ADD-ON: CPT

## 2021-05-08 PROCEDURE — 93005 ELECTROCARDIOGRAM TRACING: CPT

## 2021-05-08 PROCEDURE — 82550 ASSAY OF CK (CPK): CPT

## 2021-05-08 PROCEDURE — 99291 CRITICAL CARE FIRST HOUR: CPT

## 2021-05-08 PROCEDURE — 93306 TTE W/DOPPLER COMPLETE: CPT

## 2021-05-08 PROCEDURE — 80053 COMPREHEN METABOLIC PANEL: CPT

## 2021-05-08 PROCEDURE — 83735 ASSAY OF MAGNESIUM: CPT

## 2021-05-08 RX ADMIN — RIVAROXABAN SCH MG: 20 TABLET, FILM COATED ORAL at 16:47

## 2021-05-08 NOTE — P.HPIM
History of Present Illness


H&P Date: 05/08/21





Mr. Hilton is a 55 years old  male patient of Dr. Bentley with past 

medical history of GERD, hyperlipidemia, anxiety, pulmonary embolism on xarelto,

history of atrial fibrillation in August 2017 status postcardiac ablation with 

Dr. Beard comes in with an episode of syncope that lasted for 1 minute when 

patient woke up at 3:30 in the morning to check on her mother.  Patient noticed 

the room getting dark and feeding fall before passing out.  He denies any 

palpitation, shortness of breath, fever or chills, and diarrhea of about change 

in bowel habits the past few days.  He was seen by his primary care physician 

for nosebleed and was holding his Aleve and xarelto for 48 hours.  He was also 

started on trazodone for insomnia 3 days ago.  Vitals were otherwise normal with

a temp of 98 pounds 85 respiratory rate 18 labs evaluated in the ER had an 

unremarkable CBC, INR 1, glucose 112 creatinine 0.97, magnesium 2.4, troponin 2

negative, BNP negative, lipase 1226.  COVID-19 nondetected.  Repeat labs were 

obtained this morning with the improvement of lipase 02/09/1971, creatinine 

stable.  CTA chest was negative for PE.  Gallbladder ultrasound was obtained 

that was negative for gallstones.  With history of previous atrial fibrillation,

cardiology consult was placed to rule out possible arrhythmia.  GI consulted for

pancreatitis





Review of Systems





Constitutional: Denies chills, Denies  fever, Denies lethargy, Denies malaise, 

Denies poor appetite, Denies weakness, Denies weight loss


Eyes: denies decreased vision, denies diplopia, denies discharge, denies pain


Ears: deny: decreased hearing


Ears, nose, mouth and throat: Denies dental pain, Denies headache, Denies nasal 

discharge, Denies nose pain history of nosebleed


Cardiovascular: Denies chest pain, Denies decreased exercise tolerance, Denies 

edema, Denies high blood pressure, Denies irregular heart beat, Denies 

palpitations, Denies paroxysmal nocturnal dyspnea, Denies rapid heart beat, 

Denies shortness of breath


Respiratory: Denies congestion, Denies cough, Denies cough with sputum, Denies 

dyspnea, Denies home oxygen, Denies wheezing


Gastrointestinal: Denies abdominal pain, Denies change in bowel habits, Denies 

coffee ground emesis, Denies early satiety, Denies excessive gas, Denies 

heartburn, Denies hematemesis, Denies hematochezia, Denies loss of appetite, De

nies nausea, Denies vomiting


Genitourinary: Denies dysuria, Denies flank pain, Denies kidney stones, Denies 

menorrhagia, Denies urgency, Denies urinary frequency


Musculoskeletal: Denies gait dysfunction, Denies limitation of motion, Denies 

morning stiffness, Denies muscle cramps


Integumentary: Denies rash, Denies wounds, Denies brittle nails, Denies change 

in hair/nails, Denies darkening of skin


Neurological: Denies balance difficulties, Denies change in speech, Denies 

double vision, Denies gait dysfunction, Denies loss of vision, Denies motor 

disturbance, Denies numbness, Denies paralysis, Denies paresthesias, Denies 

seizures post for syncope


Psychiatric: Denies anxiety, Denies depression positive for insomnia


Endocrine: Denies excessive sweating, Denies excessive thirst, Denies high blood

sugars, Denies palpitations


Hematologic/Lymphatic: Denies easy bruising, Denies lymphadenopathy





Past Medical History


Past Medical History: GERD/Reflux, Hyperlipidemia, Pulmonary Embolus (PE)


Additional Past Medical History / Comment(s): Hx pneumonia


History of Any Multi-Drug Resistant Organisms: None Reported


Past Surgical History: Cardiac Ablation, Heart Catheterization


Additional Past Surgical History / Comment(s): heart ablation


Past Anesthesia/Blood Transfusion Reactions: No Reported Reaction


Past Psychological History: No Psychological Hx Reported


Additional Psychological History / Comment(s): PT LIVES WITH HIS PARENTS. HAS 2 

STEPS IN WHICH TO ENTER HOME. RECEIVES NO HOME CARE SERVICES. HAS NO MEDICAL 

EQUIPMENT. NO  SERVICE IN BACK GROUND.. PT WORKS FOR THE [employer].


Smoking Status: Never smoker


Past Alcohol Use History: None Reported, Occasional


Additional Past Alcohol Use History / Comment(s): STATED OCC CIGAR USE STARTED 

AT AGE 27(1992) AND QUIT 2008


Past Drug Use History: None Reported


Additional Drug Use History / Comment(s): Patient quit tobacco use use over 10 

years ago occasionally he would smoke cigars patient denies any social drug use 

,PAST occ  alcohol BUT NONE NOW. there is no O2 requirements at home no 

nebulizer needs no CPAP needs patient lives with the parent and is single





- Past Family History


  ** Mother


Family Medical History: Coronary Artery Disease (CAD)


Additional Family Medical History / Comment(s): CABG in 70's,  one brother 

healthy no sisters no.daughter has no sons





  ** Father


Family Medical History: No Reported History





Medications and Allergies


                                Home Medications











 Medication  Instructions  Recorded  Confirmed  Type


 


ALPRAZolam [Xanax] 0.25 mg PO BID PRN 04/25/18 05/08/21 History


 


Baclofen 10 mg PO BID PRN 05/08/21 05/08/21 History


 


Rivaroxaban [Xarelto] 20 mg PO W/SUPPER 05/08/21 05/08/21 History








                                    Allergies











Allergy/AdvReac Type Severity Reaction Status Date / Time


 


No Known Allergies Allergy   Verified 05/08/21 10:48














Physical Exam


Vitals: 


                                   Vital Signs











  Temp Pulse Pulse Resp BP BP Pulse Ox


 


 05/08/21 11:45  98.6 F   85  18   117/69  95


 


 05/08/21 08:35  98.1 F  78   16  132/78   98


 


 05/08/21 06:32   84   16  123/78   98


 


 05/08/21 05:36   80   16  131/76   98


 


 05/08/21 04:33  98 F  82   28 H  136/70   100








                                Intake and Output











 05/07/21 05/08/21 05/08/21





 22:59 06:59 14:59


 


Other:   


 


  Weight  100.244 kg 100.244 kg














- Constitutional


General appearance: cooperative, no acute distress, obese





- EENT


Eyes: anicteric sclerae, PERRLA, normal appearance


ENT: hearing grossly normal





- Neck


Neck: no lymphadenopathy, normal ROM, no other, no rigidity, no stridor, no 

thyromegaly





- Respiratory


Respiratory: bilateral: CTA, negative: diminished, dullness, rales, rhonchi





- Cardiovascular


Rhythm: regular


Heart sounds: normal: S1, S2


Abnormal Heart Sounds: no systolic murmur, no diastolic murmur, no rub, no S3 

Gallop, no S4 Gallop, no click, no other





- Gastrointestinal


General gastrointestinal: normal bowel sounds, soft discomfort in the lower 

quadrant abdomen





- Integumentary


Integumentary: no rash





- Neurologic


Neurologic: CNII-XII intact





- Musculoskeletal


Musculoskeletal: gait normal, strength equal bilaterally





- Psychiatric


Psychiatric: A&O x's 3, appropriate affect





Results


CBC & Chem 7: 


                                 05/08/21 11:00





                                 05/08/21 11:00


Labs: 


                  Abnormal Lab Results - Last 24 Hours (Table)











  05/08/21 05/08/21 Range/Units





  04:57 11:00 


 


Chloride   109 H  ()  mmol/L


 


Glucose  112 H   (74-99)  mg/dL


 


Calcium   8.2 L  (8.4-10.2)  mg/dL


 


Magnesium  2.4 H   (1.6-2.3)  mg/dL


 


Total Protein   5.6 L  (6.3-8.2)  g/dL


 


Albumin   3.2 L  (3.5-5.0)  g/dL


 


Lipase  1226 H  973 H  ()  U/L














Thrombosis Risk Factor Assmnt





- DVT/VTE Prophylaxis


DVT/VTE Prophylaxis: Pharmacologic Prophylaxis ordered





- Choose All That Apply


Any of the Below Risk Factors Present?: Yes


Each Factor Represents 1 point: Age 41-60 years


Thrombosis Risk Factor Assessment Total Risk Factor Score: 1


Thrombosis Risk Factor Assessment Level: Low Risk





Assessment and Plan


Plan: 





#1 acute syncope likely orthostatic secondary to trazodone.  PE ruled out CT 

negative.  Continue IV fluids at 1 30 mL per hour.  Rule out arrhythmias as 

patient has history of atrial fibrillation status post ablation with Dr. Beard.  Cardiology consult pending.





#2 acute pancreatitis unclear etiology.  Ultrasound liver and gallbladder are 

negative for gallstones.  Lipid panel pending





#3 history of pulmonary embolism continue xarelto 20 mg by mouth daily at supper





#4 history of nosebleed on aleve and xarelto.  Patient's xarelto is on hold for 

48 hours prior to the episode





#5 history of atrial fibrillation status post ablation continue xarelto





#6 history of anxiety Ativan 0.25 mg twice a day as needed for anxiety





#7 insomnia recent initiation of present on hold on discharge





#8 CODE STATUS full code





#9 DVT prophylaxis with xarelto





#10 disposition patient need 1-2 inpatient nights for monitoring

## 2021-05-08 NOTE — ED
Syncope HPI





- General


Chief Complaint: Syncope


Stated Complaint: Syncope


Time Seen by Provider: 05/08/21 04:54


Source: patient, EMS, RN notes reviewed, old records reviewed


Mode of arrival: EMS


Limitations: no limitations





- History of Present Illness


Initial Comments: 





This is a 55-year-old male DF for evaluation.  Patient presents today for 

evaluation regards to not feeling well.  Severely anxious, history of anxiety.  

Patient got up and fell back down passing out.  Patient's history of blood clots

do a bloody nose recently.  Blood thinning medication.  Patient's very anxious 

on arrival to ER Is got off


MD Complaint: loss of consciousness, collapsed


-: minutes(s)


Prodromal Symptoms: none


-: second(s)


Witnessed: no


Injuries Sustained Associated with Event: None


Current Symptoms: back to baseline, other (Anxiety)


History: previous syncopal episode


Context: getting out of bed





- Related Data


                                Home Medications











 Medication  Instructions  Recorded  Confirmed


 


ALPRAZolam [Xanax] 0.25 mg PO TID PRN 04/25/18 06/04/18


 


Escitalopram [Lexapro] 20 mg PO DAILY 04/25/18 06/04/18








                                  Previous Rx's











 Medication  Instructions  Recorded


 


Rivaroxaban [Xarelto] 20 mg PO DAILY #0 04/26/18











                                    Allergies











Allergy/AdvReac Type Severity Reaction Status Date / Time


 


No Known Allergies Allergy   Verified 05/08/21 04:33














Review of Systems


ROS Statement: 


Those systems with pertinent positive or pertinent negative responses have been 

documented in the HPI.





ROS Other: All systems not noted in ROS Statement are negative.





Past Medical History


Past Medical History: GERD/Reflux, Hyperlipidemia, Pulmonary Embolus (PE)


Additional Past Medical History / Comment(s): Hx pneumonia


History of Any Multi-Drug Resistant Organisms: None Reported


Past Surgical History: Cardiac Ablation, Heart Catheterization


Additional Past Surgical History / Comment(s): heart ablation


Past Anesthesia/Blood Transfusion Reactions: No Reported Reaction


Past Psychological History: No Psychological Hx Reported


Smoking Status: Never smoker


Past Alcohol Use History: None Reported, Occasional


Past Drug Use History: None Reported





- Past Family History


  ** Mother


Family Medical History: Coronary Artery Disease (CAD)


Additional Family Medical History / Comment(s): CABG in 70's,  one brother 

healthy no sisters no.daughter has no sons





  ** Father


Family Medical History: No Reported History





General Exam


Limitations: no limitations


General appearance: alert, in no apparent distress, anxious


Head exam: Present: atraumatic, normocephalic, normal inspection


Eye exam: Present: normal appearance, PERRL, EOMI.  Absent: scleral icterus, 

conjunctival injection, periorbital swelling


ENT exam: Present: normal exam, mucous membranes moist


Neck exam: Present: normal inspection.  Absent: tenderness, meningismus, 

lymphadenopathy


Respiratory exam: Present: normal lung sounds bilaterally.  Absent: respiratory 

distress, wheezes, rales, rhonchi, stridor


Cardiovascular Exam: Present: regular rate, normal rhythm, normal heart sounds. 

 Absent: systolic murmur, diastolic murmur, rubs, gallop, clicks


GI/Abdominal exam: Present: soft, normal bowel sounds.  Absent: distended, 

tenderness, guarding, rebound, rigid


Extremities exam: Present: normal inspection, full ROM, normal capillary refill.

  Absent: tenderness, pedal edema, joint swelling, calf tenderness


Back exam: Present: normal inspection


Neurological exam: Present: alert, oriented X3, CN II-XII intact


Psychiatric exam: Present: normal affect, normal mood


Skin exam: Present: warm, dry, intact, normal color.  Absent: rash





Course


                                   Vital Signs











  05/08/21 05/08/21





  04:33 05:36


 


Temperature 98 F 


 


Pulse Rate 82 80


 


Respiratory 28 H 16





Rate  


 


Blood Pressure 136/70 131/76


 


O2 Sat by Pulse 100 98





Oximetry  














- Reevaluation(s)


Reevaluation #1: 





05/08/21 05:22


medical record is reviewed


Reevaluation #2: 





05/08/21 06:35


Patient not requiring any specific pain medication currently





- Consultations


Consultation #1: 





Spoke with Dr. Juárez who will admit this patient





EKG Findings





- EKG Comments:


EKG Findings:: EKG shows sinus rhythm 74  QRS 96 QTc 457





Medical Decision Making





- Medical Decision Making





55 male to the ER for evaluation patient with significant syncopal event.  

Patient is negative for PE as he does have history of PE patient does have new 

acute pancreatitis and also history of arrhythmia.  Patient will be admitted for

 both cardiology and GI to evaluate, ultrasound is pending





- Lab Data


Result diagrams: 


                                 05/08/21 04:57





                                 05/08/21 04:57


                                   Lab Results











  05/08/21 05/08/21 05/08/21 Range/Units





  04:57 04:57 04:57 


 


WBC  7.5    (3.8-10.6)  k/uL


 


RBC  5.00    (4.30-5.90)  m/uL


 


Hgb  15.4    (13.0-17.5)  gm/dL


 


Hct  43.5    (39.0-53.0)  %


 


MCV  87.1    (80.0-100.0)  fL


 


MCH  30.7    (25.0-35.0)  pg


 


MCHC  35.3    (31.0-37.0)  g/dL


 


RDW  12.5    (11.5-15.5)  %


 


Plt Count  277    (150-450)  k/uL


 


MPV  6.4    


 


Neutrophils %  62    %


 


Lymphocytes %  28    %


 


Monocytes %  7    %


 


Eosinophils %  1    %


 


Basophils %  1    %


 


Neutrophils #  4.7    (1.3-7.7)  k/uL


 


Lymphocytes #  2.1    (1.0-4.8)  k/uL


 


Monocytes #  0.5    (0-1.0)  k/uL


 


Eosinophils #  0.1    (0-0.7)  k/uL


 


Basophils #  0.0    (0-0.2)  k/uL


 


PT   10.5   (9.0-12.0)  sec


 


INR   1.0   (<1.2)  


 


APTT   22.2   (22.0-30.0)  sec


 


Sodium    137  (137-145)  mmol/L


 


Potassium    3.9  (3.5-5.1)  mmol/L


 


Chloride    105  ()  mmol/L


 


Carbon Dioxide    25  (22-30)  mmol/L


 


Anion Gap    7  mmol/L


 


BUN    17  (9-20)  mg/dL


 


Creatinine    0.97  (0.66-1.25)  mg/dL


 


Est GFR (CKD-EPI)AfAm    >90  (>60 ml/min/1.73 sqM)  


 


Est GFR (CKD-EPI)NonAf    90  (>60 ml/min/1.73 sqM)  


 


Glucose    112 H  (74-99)  mg/dL


 


Calcium    9.1  (8.4-10.2)  mg/dL


 


Magnesium    2.4 H  (1.6-2.3)  mg/dL


 


Total Bilirubin    1.1  (0.2-1.3)  mg/dL


 


AST    21  (17-59)  U/L


 


ALT    26  (4-49)  U/L


 


Alkaline Phosphatase    60  ()  U/L


 


Creatine Kinase    64  ()  U/L


 


Troponin I     (0.000-0.034)  ng/mL


 


NT-Pro-B Natriuret Pep     pg/mL


 


Total Protein    6.4  (6.3-8.2)  g/dL


 


Albumin    3.8  (3.5-5.0)  g/dL


 


Lipase    1226 H  ()  U/L














  05/08/21 05/08/21 Range/Units





  04:57 04:57 


 


WBC    (3.8-10.6)  k/uL


 


RBC    (4.30-5.90)  m/uL


 


Hgb    (13.0-17.5)  gm/dL


 


Hct    (39.0-53.0)  %


 


MCV    (80.0-100.0)  fL


 


MCH    (25.0-35.0)  pg


 


MCHC    (31.0-37.0)  g/dL


 


RDW    (11.5-15.5)  %


 


Plt Count    (150-450)  k/uL


 


MPV    


 


Neutrophils %    %


 


Lymphocytes %    %


 


Monocytes %    %


 


Eosinophils %    %


 


Basophils %    %


 


Neutrophils #    (1.3-7.7)  k/uL


 


Lymphocytes #    (1.0-4.8)  k/uL


 


Monocytes #    (0-1.0)  k/uL


 


Eosinophils #    (0-0.7)  k/uL


 


Basophils #    (0-0.2)  k/uL


 


PT    (9.0-12.0)  sec


 


INR    (<1.2)  


 


APTT    (22.0-30.0)  sec


 


Sodium    (137-145)  mmol/L


 


Potassium    (3.5-5.1)  mmol/L


 


Chloride    ()  mmol/L


 


Carbon Dioxide    (22-30)  mmol/L


 


Anion Gap    mmol/L


 


BUN    (9-20)  mg/dL


 


Creatinine    (0.66-1.25)  mg/dL


 


Est GFR (CKD-EPI)AfAm    (>60 ml/min/1.73 sqM)  


 


Est GFR (CKD-EPI)NonAf    (>60 ml/min/1.73 sqM)  


 


Glucose    (74-99)  mg/dL


 


Calcium    (8.4-10.2)  mg/dL


 


Magnesium    (1.6-2.3)  mg/dL


 


Total Bilirubin    (0.2-1.3)  mg/dL


 


AST    (17-59)  U/L


 


ALT    (4-49)  U/L


 


Alkaline Phosphatase    ()  U/L


 


Creatine Kinase    ()  U/L


 


Troponin I  <0.012   (0.000-0.034)  ng/mL


 


NT-Pro-B Natriuret Pep   37  pg/mL


 


Total Protein    (6.3-8.2)  g/dL


 


Albumin    (3.5-5.0)  g/dL


 


Lipase    ()  U/L














- Radiology Data


Radiology results: report reviewed (CT chest negative for acute disease 

GALLBLADDER PENDING), image reviewed





Critical Care Time


Critical Care Time: Yes


Total Critical Care Time: 31





Disposition


Clinical Impression: 


 Syncope, Dehydration, Acute pancreatitis, Anxiety





Disposition: ADMITTED AS IP TO THIS [facility]


Condition: Fair


Is patient prescribed a controlled substance at d/c from ED?: No


Referrals: 


Luis Felipe Bentley MD [Primary Care Provider] - 1-2 days

## 2021-05-08 NOTE — CT
EXAM:

  CT Angiography Chest With Intravenous Contrast

 

CLINICAL HISTORY:

  ITS.REASON CT Reason: pe

 

TECHNIQUE:

  Axial computed tomographic angiography images of the chest with 

intravenous contrast.  CTDI is 16.27 mGy and DLP is 505.8 mGy-cm.  This 

CT exam was performed using one or more of the following dose reduction 

techniques: automated exposure control, adjustment of the mA and/or kV 

according to patient size, and/or use of iterative reconstruction 

technique.

  MIP reconstructed images were created and reviewed.

 

COMPARISON:

  No relevant prior studies available.

 

FINDINGS:

  Pulmonary arteries:  Unremarkable.  No pulmonary embolism.

  Aorta:  No acute findings.  No thoracic aortic aneurysm or dissection.

  Lungs:  Unremarkable.  No mass.  No consolidation.

  Pleural space:  Unremarkable.  No significant effusion.  No 

pneumothorax.

  Heart:  Unremarkable.  No cardiomegaly.  No significant pericardial 

effusion.  No evidence of RV dysfunction.

  Bones/joints:  No acute fracture.  No dislocation.

  Soft tissues:  Unremarkable.

  Lymph nodes:  Unremarkable.  No enlarged lymph nodes.

 

IMPRESSION:     

  Normal chest CTA.  No pulmonary embolism.

## 2021-05-08 NOTE — US
EXAMINATION TYPE: US gallbladder

 

DATE OF EXAM: 5/8/2021

 

COMPARISON: CT's & US, 7/17/2017

 

CLINICAL HISTORY: GBpancreatitis.

 

EXAM MEASUREMENTS:

 

Liver Length:  15.1 cm   

Gallbladder Wall:  0.2 cm   

CBD:  0.4 cm

Right Kidney:  11.5 x 4.6 x 5.5 cm

 

 

 

Pancreas:  visualized portions wnl

Liver:  difficult to penetrate  

Gallbladder:  No stones seen

**Evidence for sonographic Roman's sign:  Yes

CBD:  wnl 

Right Kidney:  No hydronephrosis or masses seen 

 

 

 

IMPRESSION: Normal right upper quadrant ultrasound.

## 2021-05-09 VITALS — RESPIRATION RATE: 17 BRPM

## 2021-05-09 LAB
CHOLEST SERPL-MCNC: 176 MG/DL (ref ?–200)
HDLC SERPL-MCNC: 32 MG/DL (ref 40–60)
LDLC SERPL CALC-MCNC: 108 MG/DL (ref 0–99)
TRIGL SERPL-MCNC: 182 MG/DL (ref ?–150)

## 2021-05-09 RX ADMIN — RIVAROXABAN SCH MG: 20 TABLET, FILM COATED ORAL at 16:56

## 2021-05-09 NOTE — CONS
CONSULTATION



HISTORY OF PRESENT ILLNESS:

Main Hilton is a 55-year-old gentleman with a remote history of pulmonary embolism.  He

also takes rivaroxaban.  He sees Dr. Bentley and apparently took a new medicine to help

him sleep and with this he feels groggy and he had a spell when he was walking at 3:00

am in the morning from his room to the bathroom.  He felt that he passed out.  He

believes it is related to the new sleep medicine that he took, the name of which he is

unable to recall and nothing is listed in his medication list.  He takes Xarelto 20 mg

daily for a remote history of pulmonary embolism.  In 2018, he had a recurrent SVT,

underwent radiofrequency ablation for AVNRT by Dr. Dumont, but has not seen him in

followup on a regular basis.  He has no chest pain.  He is comfortable resting.  His

syncope is very unclear and I do not believe it is a cardiogenic type syncope.  Could

have also had a vasovagal component because he just got up from sleep and was having an

uncomfortable bladder sensation at that time.  He is resting comfortably without

symptoms.



MEDICATIONS:

Medications at home include Xarelto 20 mg daily, baclofen 10 mg b.i.d. and Xanax.



ALLERGIES:

None.



REVIEW OF SYMPTOMS:

Review of systems unremarkable other than above-mentioned facts.



PHYSICAL EXAM:

Revealed blood pressure of 119/70, pulse rate is 80 per minute.

HEENT unremarkable.  Fundus was not examined by me.

NECK is supple.  There is no JVD.  I do not hear a carotid bruit.

HEART exam reveals S1, S2 without significant murmurs.

LUNGS revealed bilateral decent air entry.

ABDOMEN is soft, nontender.

Lower EXTREMITIES reveal diminished pulses.  No edema.

CENTRAL NERVOUS SYSTEM is normal.



EKG revealed a lot of artifact but appears to be sinus with some baseline artifact.  No

acute changes.



LABORATORY DATA:

Reveals that his troponins are normal.  BNP is unremarkable.  Renal function is normal.



IMPRESSION:

1. Probable near syncope, seems vasovagal.

2. History of SVT, previous radiofrequency ablation.

3. Remote history of pulmonary embolism.



RECOMMENDATIONS:

I am recommending an echo to be performed on Monday and event monitor and he can be

discharged home after that and can follow up with Dr. Dumont.  I explained to him the

importance of following up with Dr. Dumont since he had an arrhythmia and previous

ablation.  No aggressive intervention until we see the results of event monitor at this

time.  We will check echo to assess LV function.



Thank you very much for the consult.





ALEX / PARMINDER: [id number] / Job#: [id number]

## 2021-05-09 NOTE — P.PN
Subjective


Progress Note Date: 05/09/21








Mr. Hilton is a 55 years old  male patient of Dr. Bentley with past 

medical history of GERD, hyperlipidemia, anxiety, pulmonary embolism on xarelto,

history of atrial fibrillation in August 2017 status postcardiac ablation with 

Dr. Beard comes in with an episode of syncope that lasted for 1 minute when 

patient woke up at 3:30 in the morning to check on her mother.  Patient noticed 

the room getting dark and feeding fall before passing out.  He denies any 

palpitation, shortness of breath, fever or chills, and diarrhea of about change 

in bowel habits the past few days.  He was seen by his primary care physician 

for nosebleed and was holding his Aleve and xarelto for 48 hours.  He was also s

tarted on trazodone for insomnia 3 days ago.  Vitals were otherwise normal with 

a temp of 98 pounds 85 respiratory rate 18 labs evaluated in the ER had an 

unremarkable CBC, INR 1, glucose 112 creatinine 0.97, magnesium 2.4, troponin 2

negative, BNP negative, lipase 1226.  COVID-19 nondetected.  Repeat labs were 

obtained this morning with the improvement of lipase 02/09/1973, creatinine 

stable.  CTA chest was negative for PE.  Gallbladder ultrasound was obtained 

that was negative for gallstones.  With history of previous atrial fibrillation,

cardiology consult was placed to rule out possible arrhythmia.  GI consulted for

pancreatitis





4/9 patient examined bedside.  No new episodes since yesterday.  Patient denies 

any chest pain breathing issues or dizziness. vital 97.8, Hr 80, RR18, / 

56, no labs today, will repeat tomorrow. Cardiology  evaluated the patient, plan

for event monitor on monday, Echo ordered. if no more symptoms patient in the 

discharge tomorrow





Review of system 


Constitutional: Denies chills, Denies  fever, Denies lethargy, Denies malaise, 

Denies poor appetite, Denies weakness, Denies weight loss


Eyes: denies decreased vision, denies diplopia, denies discharge, denies pain


Ears: deny: decreased hearing


Ears, nose, mouth and throat: Denies dental pain, Denies headache, Denies nasal 

discharge, Denies nose pain


Cardiovascular: Denies chest pain, Denies decreased exercise tolerance, Denies 

edema, Denies high blood pressure, Denies irregular heart beat, Denies 

palpitations, Denies paroxysmal nocturnal dyspnea, Denies rapid heart beat, 

Denies shortness of breath


Respiratory: Denies congestion, Denies cough, Denies cough with sputum, Denies 

dyspnea, Denies home oxygen, Denies wheezing


Gastrointestinal: Denies abdominal pain, Denies change in bowel habits, Denies 

coffee ground emesis, Denies early satiety, Denies excessive gas, Denies 

heartburn, Denies hematemesis, Denies hematochezia, Denies loss of appetite, 

Denies nausea, Denies vomiting


Genitourinary: Denies dysuria, Denies flank pain, Denies kidney stones, Denies 

menorrhagia, Denies urgency, Denies urinary frequency


Musculoskeletal: Denies gait dysfunction, Denies limitation of motion, Denies 

morning stiffness, Denies muscle cramps


Integumentary: Denies rash, Denies wounds, Denies brittle nails, Denies change 

in hair/nails, Denies darkening of skin


Neurological: Denies balance difficulties, Denies change in speech, Denies 

double vision, Denies gait dysfunction, Denies loss of vision, Denies motor 

disturbance, Denies numbness, Denies paralysis, Denies paresthesias, Denies 

seizures


Psychiatric: Denies anxiety, Denies depression


Endocrine: Denies excessive sweating, Denies excessive thirst, Denies high blood

sugars, Denies palpitations


Hematologic/Lymphatic: Denies easy bruising, Denies lymphadenopathy  





Objective





- Vital Signs


Vital signs: 


                                   Vital Signs











Temp  97.8 F   05/09/21 12:10


 


Pulse  80   05/09/21 12:10


 


Resp  18   05/09/21 12:10


 


BP  104/56   05/09/21 12:10


 


Pulse Ox  98   05/09/21 12:10








                                 Intake & Output











 05/08/21 05/09/21 05/09/21





 18:59 06:59 18:59


 


Intake Total 130 0 


 


Output Total  350 1


 


Balance 130 -350 -1


 


Weight 100.244 kg 99.6 kg 


 


Intake:   


 


  Intake, IV Titration 130  





  Amount   


 


    Sodium Chloride 0.9% 1, 130  





    000 ml @ 130 mls/hr IV .   





    [id number] STA Rx#:[id number]   


 


  Oral  0 


 


Output:   


 


  Urine  350 1


 


Other:   


 


  Voiding Method  Toilet 





  Urinal 


 


  # Voids 1  


 


  # Bowel Movements 1  














- Exam





- Constitutional


General appearance: cooperative, no acute distress, obese





- EENT


Eyes: anicteric sclerae, PERRLA, normal appearance


ENT: hearing grossly normal





- Neck


Neck: no lymphadenopathy, normal ROM, no other, no rigidity, no stridor, no 

thyromegaly





- Respiratory


Respiratory: bilateral: CTA, negative: diminished, dullness, rales, rhonchi





- Cardiovascular


Rhythm: regular


Heart sounds: normal: S1, S2


Abnormal Heart Sounds: no systolic murmur, no diastolic murmur, no rub, no S3 

Gallop, no S4 Gallop, no click, no other





- Gastrointestinal


General gastrointestinal: normal bowel sounds, soft





- Integumentary


Integumentary: no rash





- Neurologic


Neurologic: CNII-XII intact





- Musculoskeletal


Musculoskeletal: gait normal, strength equal bilaterally





- Psychiatric


Psychiatric: A&O x's 3, appropriate affect





- Labs


CBC & Chem 7: 


                                 05/08/21 11:00





                                 05/08/21 11:00


Labs: 


                  Abnormal Lab Results - Last 24 Hours (Table)











  05/08/21 Range/Units





  11:00 


 


Triglycerides  182 H  (<150)  mg/dL


 


LDL Cholesterol, Calc  108 H  (0-99)  mg/dL


 


HDL Cholesterol  32 L  (40-60)  mg/dL














Assessment and Plan


Plan: 





#1 acute syncope likely orthostatic secondary to trazodone.  PE ruled out CT 

negative.  Continue IV fluids at 1 30 mL per hour.  Rule out arrhythmias as 

patient has history of atrial fibrillation status post ablation with Dr. Beard.  Cardiology recommends event monitor and echocardiogram





#2 acute pancreatitis unclear etiology.  Ultrasound liver and gallbladder are 

negative for gallstones.  Lipid panel pending





#3 history of pulmonary embolism continue xarelto 20 mg by mouth daily at supper





#4 history of nosebleed on aleve and xarelto.  Patient's xarelto is on hold for 

48 hours prior to the episode





#5 history of atrial fibrillation status post ablation continue xarelto





#6 history of anxiety Ativan 0.25 mg twice a day as needed for anxiety





#7 insomnia recent initiation of present on hold on discharge





#8 CODE STATUS full code





#9 DVT prophylaxis with xarelto





#10 disposition likely discharge tomorrow

## 2021-05-09 NOTE — CONS
CONSULTATION



DATE OF SERVICE:

05/09/2021



REASON FOR CONSULTATION:

Acute pancreatitis.



HISTORY OF PRESENT ILLNESS:

The patient is a 55-year-old pleasant white male admitted to the hospital after having

an episode of syncope at home.  The patient woke up at 3:00 am in the morning to go to

the restroom and he blacked out and subsequently passed out and fell on the floor.  He

came to the emergency room and subsequently admitted to the hospital for further

evaluation.  In the ER he was noted to have mild elevation of lipase and hence we are

consulted because of this issue.  The patient denies any epigastric pain.  He reports

no nausea, vomiting.  No prior history of pancreatitis in the past.  No history of

alcohol abuse.

Lipase initially was 1226 and this morning it is down to 973.



He did have CTA of the chest that was negative for PE.  Subsequently he did have

ultrasound of the gallbladder that was negative for any gallstones.



He denies any family history of pancreatitis.



PAST MEDICAL HISTORY:

Significant for GERD, hypertension, atrial fibrillation status post ablation, history

of PE.



PAST SURGICAL HISTORY:

Cardiac ablation for atrial fibrillation four years ago and catheterization.



MEDICATIONS:

Medications at home include Xanax, Xarelto, baclofen.



ALLERGIES:

None.



SOCIAL HISTORY:

No smoking, no alcohol use.



FAMILY HISTORY:

Mother coronary artery disease and father unremarkable.



REVIEW OF SYSTEMS:

CARDIOPULMONARY: He denies any chest pain or shortness of breath.

:  No dysuria or hematuria.

MUSCULOSKELETAL:  Unremarkable.

SKIN:  Unremarkable.

ENDOCRINE:  Unremarkable.

PSYCHIATRIC:  Unremarkable.

NEUROLOGY: Episode of syncope.

ENT/VISION:  Unremarkable.

CONSTITUTIONAL: No recent weight loss.  No fever, chills, night sweats.



PHYSICAL EXAMINATION:

Appears comfortable.

VITAL SIGNS:  Stable.  Blood pressure is 102/55, pulse is 75, temperature 97.7.

HEENT examination unremarkable.  Conjunctivae pink. Sclerae anicteric.  Oral cavity no

lesions.

NECK:  No JVD.  No lymph node enlargement.

CHEST:  Clear to auscultation.

HEART:  Regular rate and rhythm.

ABDOMEN:  Soft. Bowel sounds are positive.  It was nontender, nondistended.

EXTREMITIES:  No pedal edema.

NEUROLOGIC:  Alert and oriented x3.  No focal deficits.



LABS:

WBC 7.2, hemoglobin 14, platelets normal.  Lipase was 1226 and today it is down to 973.

AST, ALT, T-bilirubin and alkaline phosphatase are all within normal limits.



IMPRESSION:

1. Asymptomatic elevation of lipase at 1226 in this patient who has no abdominal pain,

    nausea, vomiting, consistent with mild acute pancreatitis.  No history of alcohol

    use.  Ultrasound showed no gallstones.  Lipase has decreased to 973 today.

    Etiology of pancreatitis remains unclear but appears to be a very mild episode at

    the present time.  He was started on new medications with trazodone a few days ago

    but at this time I doubt if it is medication induced.

2. Episode of syncope being investigated and cardiology has been consulted.

3. History of atrial fibrillation, on Xarelto.



RECOMMENDATIONS:

1. Since patient is asymptomatic and has no abdominal symptoms we will start him on a

    regular cardiac diet today.

2. Monitor lipase.

3. No need for any further workup as the patient appears to have a mild episode of

    acute pancreatitis, appears to be already resolving.

We will follow with you closely





MMODL / IJN: [id number] / Job#: [id number]

## 2021-05-09 NOTE — PN
PROGRESS NOTE



Mr. Hilton is doing well.  He really has not had any episode of what 1 would describe as

a syncope.  It seemed like a near syncopal picture.  However, he is maintaining sinus

rhythm.  He has a history of ablation in the past.  I am recommending that we will

continue current medications, perform an event monitor for 30 days and have him follow

up with Dr. Dumont who has done his SVT ablation in the past.  He is maintaining sinus

rhythm, has no arrhythmia.  He feels well.  He also has a remote history of pulmonary

embolism and takes Xarelto.



Vitals are stable.  No JVD.  S1-S2 heard normally.  There is no significant murmur.

Lungs are clear.  Abdomen and lower exam are unchanged.





MMODL / IJN: [id number] / Job#: [id number]

## 2021-05-10 VITALS — DIASTOLIC BLOOD PRESSURE: 75 MMHG | SYSTOLIC BLOOD PRESSURE: 106 MMHG | HEART RATE: 94 BPM

## 2021-05-10 VITALS — TEMPERATURE: 98.3 F

## 2021-05-10 LAB
ALBUMIN SERPL-MCNC: 3.6 G/DL (ref 3.5–5)
ALP SERPL-CCNC: 50 U/L (ref 38–126)
ALT SERPL-CCNC: 22 U/L (ref 4–49)
ANION GAP SERPL CALC-SCNC: 6 MMOL/L
AST SERPL-CCNC: 18 U/L (ref 17–59)
BASOPHILS # BLD AUTO: 0.1 K/UL (ref 0–0.2)
BASOPHILS NFR BLD AUTO: 1 %
BUN SERPL-SCNC: 11 MG/DL (ref 9–20)
CALCIUM SPEC-MCNC: 8.7 MG/DL (ref 8.4–10.2)
CHLORIDE SERPL-SCNC: 105 MMOL/L (ref 98–107)
CO2 SERPL-SCNC: 29 MMOL/L (ref 22–30)
EOSINOPHIL # BLD AUTO: 0.1 K/UL (ref 0–0.7)
EOSINOPHIL NFR BLD AUTO: 1 %
ERYTHROCYTE [DISTWIDTH] IN BLOOD BY AUTOMATED COUNT: 5.03 M/UL (ref 4.3–5.9)
ERYTHROCYTE [DISTWIDTH] IN BLOOD: 13 % (ref 11.5–15.5)
GLUCOSE SERPL-MCNC: 104 MG/DL (ref 74–99)
HCT VFR BLD AUTO: 45 % (ref 39–53)
HGB BLD-MCNC: 14.6 GM/DL (ref 13–17.5)
LIPASE SERPL-CCNC: 189 U/L (ref 23–300)
LYMPHOCYTES # SPEC AUTO: 1.7 K/UL (ref 1–4.8)
LYMPHOCYTES NFR SPEC AUTO: 28 %
MCH RBC QN AUTO: 29 PG (ref 25–35)
MCHC RBC AUTO-ENTMCNC: 32.4 G/DL (ref 31–37)
MCV RBC AUTO: 89.5 FL (ref 80–100)
MONOCYTES # BLD AUTO: 0.4 K/UL (ref 0–1)
MONOCYTES NFR BLD AUTO: 6 %
NEUTROPHILS # BLD AUTO: 3.8 K/UL (ref 1.3–7.7)
NEUTROPHILS NFR BLD AUTO: 62 %
PLATELET # BLD AUTO: 258 K/UL (ref 150–450)
POTASSIUM SERPL-SCNC: 4.4 MMOL/L (ref 3.5–5.1)
PROT SERPL-MCNC: 6.1 G/DL (ref 6.3–8.2)
SODIUM SERPL-SCNC: 140 MMOL/L (ref 137–145)
WBC # BLD AUTO: 6.2 K/UL (ref 3.8–10.6)

## 2021-05-10 RX ADMIN — RIVAROXABAN SCH MG: 20 TABLET, FILM COATED ORAL at 18:13

## 2021-05-10 NOTE — P.DS
Providers


Date of admission: 


05/08/21 06:28





Attending physician: 


Luis Felipe Bentley





Consults: 





                                        





05/08/21 06:28


Consult Physician Routine 


   Consulting Provider: Mirlande Flannery


   Consult Reason/Comments: pancreatitis


   Do you want consulting provider notified?: Yes


Consult Physician Urgent 


   Consulting Provider: Ayleen Hoffman


   Consult Reason/Comments: syncope


   Do you want consulting provider notified?: Yes











Primary care physician: 


Luis Felipe Mc





[facility]: 





Mr. Hilton is a 55 years old  male patient of Dr. Bentley with past 

medical history of GERD, hyperlipidemia, anxiety, pulmonary embolism on xarelto,

history of atrial fibrillation in August 2017 status postcardiac ablation with 

Dr. Beard comes in with an episode of syncope that lasted for 1 minute when 

patient woke up at 3:30 in the morning to check on her mother.  Patient noticed 

the room getting dark and feeding fall before passing out.  He denies any 

palpitation, shortness of breath, fever or chills, and diarrhea of about change 

in bowel habits the past few days.  He was seen by his primary care physician 

for nosebleed and was holding his Aleve and xarelto for 48 hours.  He was also 

started on trazodone for insomnia 3 days ago.  Vitals were otherwise normal with

a temp of 98 pounds 85 respiratory rate 18 labs evaluated in the ER had an 

unremarkable CBC, INR 1, glucose 112 creatinine 0.97, magnesium 2.4, troponin 2

negative, BNP negative, lipase 1226.  COVID-19 nondetected.  Repeat labs were 

obtained this morning with the improvement of lipase 02/09/1973, creatinine 

stable.  CTA chest was negative for PE.  Gallbladder ultrasound was obtained 

that was negative for gallstones.  With history of previous atrial fibrillation,

cardiology consult was placed to rule out possible arrhythmia.  GI consulted for

pancreatitis





4/9 patient examined bedside.  No new episodes since yesterday.  Patient denies 

any chest pain breathing issues or dizziness. vital 97.8, Hr 80, RR18, / 

56, no labs today, will repeat tomorrow. Cardiology  evaluated the patient, plan

for event monitor on monday, Echo ordered. if no more symptoms patient in the 

discharge tomorrow








4/10 patient examined bedside.  No new episodes of dizziness since yesterday.  

Patient was seen by cardiology and was recommended to get an event monitor 

orthostatic to be obtained today. If negatvie, can be discharged home . 





Review of system 


Constitutional: Denies chills, Denies  fever, Denies lethargy, Denies malaise


Eyes: denies decreased vision, denies diplopia, denies discharge, denies pain


Ears: deny: decreased hearing


Ears, nose, mouth and throat: Denies dental pain, Denies headache, Denies nasal 

discharge, Denies nose pain


Cardiovascular: Denies chest pain, Denies decreased exercise tolerance, Denies 

edema, Denies high blood pressure, Denies irregular heart beat, Denies 

palpitations, Denies paroxysmal nocturnal dyspnea, Denies rapid heart beat, 

Denies shortness of breath


Respiratory: Denies congestion, Denies cough, Denies cough with sputum, Denies d

yspnea, Denies home oxygen, Denies wheezing


Gastrointestinal: Denies abdominal pain, Denies change in bowel habits, Denies 

coffee ground emesis, Denies early satiety, Denies excessive gas, Denies hear

tburn, Denies hematemesis, Denies hematochezia, Denies loss of appetite, Denies 

nausea, Denies vomiting


Genitourinary: Denies dysuria, Denies flank pain, Denies kidney stones, Denies 

menorrhagia, Denies urgency, Denies urinary frequency


Musculoskeletal: Denies gait dysfunction, Denies limitation of motion, Denies 

morning stiffness, Denies muscle cramps


Integumentary: Denies rash, Denies wounds, Denies brittle nails, Denies change 

in hair/nails, Denies darkening of skin





- Exam





- Constitutional


General appearance: cooperative, no acute distress, obese





- EENT


Eyes: anicteric sclerae, PERRLA, normal appearance


ENT: hearing grossly normal





- Neck


Neck: no lymphadenopathy, normal ROM, no other, no rigidity, no stridor, no 

thyromegaly





- Respiratory


Respiratory: bilateral: CTA, negative: diminished, dullness, rales, rhonchi





- Cardiovascular


Rhythm: regular


Heart sounds: normal: S1, S2


Abnormal Heart Sounds: no systolic murmur, no diastolic murmur, no rub, no S3 Ga

llop, no S4 Gallop, no click, no other





- Gastrointestinal


General gastrointestinal: normal bowel sounds, soft





- Integumentary


Integumentary: no rash





- Neurologic


Neurologic: CNII-XII intact





- Musculoskeletal


Musculoskeletal: gait normal, strength equal bilaterally





- Psychiatric


Psychiatric: A&O x's 3, appropriate affect











Discharge diagnosis 





#1 acute syncope likely orthostatic secondary to trazodone. r/o cardiac , event 

monitor placed 








#2 acute pancreatitis 





#3 history of pulmonary embolism





#4 history of nosebleed on aleve and xarelto





#5 history of atrial fibrillation status post ablation 





#6 history of anxiety 





#7 insomnia r








 disposition  discharge today to home 





Patient Condition at Discharge: Fair





Plan - Discharge Summary


Discharge Rx Participant: No


New Discharge Prescriptions: 


Continue


   ALPRAZolam [Xanax] 0.25 mg PO BID PRN


     PRN Reason: Anxiety


   Rivaroxaban [Xarelto] 20 mg PO W/SUPPER


   Baclofen 10 mg PO BID PRN


     PRN Reason: Muscle Pain


Discharge Medication List





ALPRAZolam [Xanax] 0.25 mg PO BID PRN 04/25/18 [History]


Baclofen 10 mg PO BID PRN 05/08/21 [History]


Rivaroxaban [Xarelto] 20 mg PO W/SUPPER 05/08/21 [History]








Follow up Appointment(s)/Referral(s): 


Luis Felipe Bentley MD [Primary Care Provider] - 1-2 days


Beto Dumont MD [STAFF PHYSICIAN] - 1 Week


Discharge Disposition: HOME SELF-CARE

## 2021-05-10 NOTE — ECHOF
Referral Reason:syncope



MEASUREMENTS

--------

HEIGHT: 180.3 cm

WEIGHT: 99.3 kg

BP: 101/64

RVIDd:   2.8 cm     (< 3.3)

IVSd:   0.9 cm     (0.6 - 1.1)

LVIDd:   4.1 cm     (3.9 - 5.3)

LVPWd:   1.4 cm     (0.6 - 1.1)

IVSs:   1.5 cm

LVIDs:   2.4 cm

LVPWs:   1.6 cm

LAESV Index (A-L):   19.71 ml/m

Ao Diam:   3.6 cm     (2.0 - 3.7)

AV Cusp:   2.5 cm     (1.5 - 2.6)

LA Diam:   3.7 cm     (2.7 - 3.8)

MV EXCURSION:   16.009 mm     (> 18.000)

MV EF SLOPE:   103 mm/s     (70 - 150)

EPSS:   0.6 cm

MV E Byron:   0.72 m/s

MV DecT:   253 ms

MV A Byron:   0.60 m/s

MV E/A Ratio:   1.19 

RAP:   5.00 mmHg

RVSP:   17.86 mmHg







FINDINGS

--------

This was a technically good study.

The left ventricular size is normal.   Left ventricular wall thickness is normal.   Overall left vent
ricular systolic function is normal with, an EF between 55 - 60 %.   The diastolic filling pattern is
 normal for the age of the patient 12.14.

The right ventricle is normal in size.

The left atrial size is normal.    Normal LA  size by volume 22+/-6 ml/m2.

The right atrial size is normal.

The aortic valve is trileaflet and appears structurally normal.

The mitral valve is normal.   Mild mitral regurgitation is present.

The tricuspid valve appears structurally normal.   Trace tricuspid regurgitation present.   Right lilibeth
tricular systolic pressure is normal at < 35 mmHg.

There is no pulmonic regurgitation present.

The aortic root size is normal.

IVC Not well visulized.

Echo free space indicative of a pericardial fat pad.



CONCLUSIONS

--------

1. The left ventricular size is normal.

2. Left ventricular wall thickness is normal.

3. Overall left ventricular systolic function is normal with, an EF between 55 - 60 %.

4. The diastolic filling pattern is normal for the age of the patient 12.14

5. Mild mitral regurgitation is present.

6. Trace tricuspid regurgitation present.

7. Echo free space indicative of a pericardial fat pad.





SONOGRAPHER: Laurie Winters RDCS

## 2021-05-10 NOTE — P.PN
Subjective


Progress Note Date: 05/10/21


Principal diagnosis: 





Pancreatitis





This is a pleasant 55-year-old white male patient who came into the emergency 

department for syncope and chest pain.  He was noted to have an elevated lipase 

greater than 2000.  He he denied any epigastric or right upper quadrant pain, 

nausea or vomiting associated with it.  He was started on a regular diet 

yesterday.  Ultrasound of the gallbladder and CT showed no evidence of 

gallstones or CBD dilation.  Today his lipase was normal at 189.  He continues 

to deny any abdominal pain, nausea, or vomiting.  Bowel movements are normal.  

He's had no further episodes of chest pain or syncope.  Cardiology has been 

following.





Objective





- Vital Signs


Vital signs: 


                                   Vital Signs











Temp  98.0 F   05/10/21 04:00


 


Pulse  89   05/10/21 04:00


 


Resp  17   05/10/21 04:00


 


BP  101/64   05/10/21 04:00


 


Pulse Ox  98   05/10/21 04:00








                                 Intake & Output











 05/09/21 05/10/21 05/10/21





 18:59 06:59 18:59


 


Intake Total   118


 


Output Total 1  


 


Balance -1  118


 


Weight  97.5 kg 


 


Intake:   


 


  Oral   118


 


Output:   


 


  Urine 1  


 


Other:   


 


  Voiding Method  Toilet 





  Urinal 


 


  # Voids 1 0 














- Exam





General appearance: The patient is alert, oriented, appears in no acute 

distress.


HET: Head is normocephalic and atraumatic.  Conjunctiva pink.  Sclera anicteric.


Neck: Supple without lymphadenopathy.  


Abdomen: Soft,  nontender, nondistended with  bowel sounds.  No guarding or 

rigidity.


Extremities: Normal skin color and turgor.  No pedal edema


Skin: No rashes, no jaundice


Neurological: No focal deficits.  Alert and oriented 3.





- Labs


CBC & Chem 7: 


                                 05/10/21 06:22





                                 05/10/21 06:22


Labs: 


                  Abnormal Lab Results - Last 24 Hours (Table)











  05/10/21 Range/Units





  06:22 


 


Glucose  104 H  (74-99)  mg/dL


 


Total Protein  6.1 L  (6.3-8.2)  g/dL














Assessment and Plan


(1) Acute pancreatitis


Narrative/Plan: 


This is a 55-year-old white male who presented to the hospital with syncope and 

chest pain.  He was noted to have an elevated lipase greater than 2000.  He 

denied any associated abdominal pain, nausea or vomiting.  He has no previous 

history of pancreatitis, no previous alcohol or current alcohol use, CT of the 

abdomen and ultrasound show no gallstones, is no CBD dilation.  The patient is 

presenting with an asymptomatic pancreatitis unknown etiology.  Pancreatic 

enzymes normalized.


Current Visit: Yes   Status: Acute   Code(s): K85.90 - ACUTE PANCREATITIS 

WITHOUT NECROSIS OR INFECTION, UNSP   SNOMED Code(s): 100198740


   





(2) Syncope


Current Visit: Yes   Status: Acute   Code(s): R55 - SYNCOPE AND COLLAPSE   

SNOMED Code(s): 694988792


   


Plan: 





1.  Continue with symptomatic and supportive care


2.  Continue diet as tolerated


3.  Patient may be discharged home from a gastroenterology standpoint, discussed

with him to follow-up as needed and pancreatitis returns.


Thank you for this consultation, we will sign off at this time.





Dr. Diallo


I agree with the dictator's note, documented as a scribe by Elda WEI.

## 2021-05-10 NOTE — P.PN
Subjective


Mr. Hilton is a 55 years old  male with past medical history of GERD, 

hyperlipidemia, anxiety, pulmonary embolism on xarelto, history of SVT in August 2017 status post cardiac ablation with Dr. Beard comes in with an episode of 

syncope.  Echocardiogram revealed EF between 55-60%, mild mitral regurgitation, 

trace tricuspid regurgitation. Patient seen and examined at bedside, no acute 

distress. /78, heart rate 78, afebrile, maintaining oxygen saturation is 

98% on room air.  Telemetry reviewed patient in sinus mechanism heart rate 70 to

80s, no arrhythmia noted.  Troponin negative 3.  Orthostatics not completed.





GENERAL: Well-appearing, well-nourished and in no acute distress.


NECK: Supple without JVD or thyromegaly.


LUNGS: Breath sounds clear to auscultation bilaterally. Respiration equal and 

unlabored.  No wheezes, rales or rhonchi.


HEART: Regular rate and rhythm without murmurs, rubs or gallops. S1 and S2 

heard.


EXTREMITIES: Normal range of motion, no edema.  No clubbing or cyanosis. 

Peripheral pulses intact.





ASSESSMENT


Syncope - most likely vasovagal 


GERD


History of PE on Xarelto


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 





PLAN


Echocardiogram with no acute findings, normal EF 


Obtain orthostatic vital signs - if no acute findings. Ok to discharge from 

cardiology perspective. 


Patient to follow up with   outpatient 





Nurse Practitioner note has been reviewed, I agree with a documented findings 

and plan of care.  Patient was seen and examined.


























Objective





- Vital Signs


Vital signs: 


                                   Vital Signs











Temp  98.3 F   05/10/21 08:00


 


Pulse  94   05/10/21 08:00


 


Resp  17   05/10/21 04:00


 


BP  127/77   05/10/21 08:00


 


Pulse Ox  93 L  05/10/21 08:00








                                 Intake & Output











 05/09/21 05/10/21 05/10/21





 18:59 06:59 18:59


 


Intake Total   218


 


Output Total 1  


 


Balance -1  218


 


Weight  97.5 kg 


 


Intake:   


 


  Oral   218


 


Output:   


 


  Urine 1  


 


Other:   


 


  Voiding Method  Toilet 





  Urinal 


 


  # Voids 1 0 














- Labs


CBC & Chem 7: 


                                 05/10/21 06:22





                                 05/10/21 06:22


Labs: 


                  Abnormal Lab Results - Last 24 Hours (Table)











  05/10/21 Range/Units





  06:22 


 


Glucose  104 H  (74-99)  mg/dL


 


Total Protein  6.1 L  (6.3-8.2)  g/dL

## 2021-07-23 ENCOUNTER — HOSPITAL ENCOUNTER (EMERGENCY)
Dept: HOSPITAL 47 - EC | Age: 56
Discharge: HOME | End: 2021-07-23
Payer: COMMERCIAL

## 2021-07-23 VITALS
TEMPERATURE: 98.1 F | HEART RATE: 87 BPM | SYSTOLIC BLOOD PRESSURE: 127 MMHG | RESPIRATION RATE: 16 BRPM | DIASTOLIC BLOOD PRESSURE: 85 MMHG

## 2021-07-23 DIAGNOSIS — R07.89: ICD-10-CM

## 2021-07-23 DIAGNOSIS — Z79.899: ICD-10-CM

## 2021-07-23 DIAGNOSIS — F41.9: Primary | ICD-10-CM

## 2021-07-23 DIAGNOSIS — Z86.711: ICD-10-CM

## 2021-07-23 DIAGNOSIS — F39: ICD-10-CM

## 2021-07-23 LAB
ALBUMIN SERPL-MCNC: 4.4 G/DL (ref 3.5–5)
ALP SERPL-CCNC: 53 U/L (ref 38–126)
ALT SERPL-CCNC: 24 U/L (ref 4–49)
ANION GAP SERPL CALC-SCNC: 8 MMOL/L
AST SERPL-CCNC: 25 U/L (ref 17–59)
BASOPHILS # BLD AUTO: 0 K/UL (ref 0–0.2)
BASOPHILS NFR BLD AUTO: 1 %
BUN SERPL-SCNC: 15 MG/DL (ref 9–20)
CALCIUM SPEC-MCNC: 9.9 MG/DL (ref 8.4–10.2)
CHLORIDE SERPL-SCNC: 111 MMOL/L (ref 98–107)
CO2 SERPL-SCNC: 21 MMOL/L (ref 22–30)
EOSINOPHIL # BLD AUTO: 0 K/UL (ref 0–0.7)
EOSINOPHIL NFR BLD AUTO: 1 %
ERYTHROCYTE [DISTWIDTH] IN BLOOD BY AUTOMATED COUNT: 5.25 M/UL (ref 4.3–5.9)
ERYTHROCYTE [DISTWIDTH] IN BLOOD: 12.3 % (ref 11.5–15.5)
GLUCOSE SERPL-MCNC: 107 MG/DL (ref 74–99)
HCT VFR BLD AUTO: 47.2 % (ref 39–53)
HGB BLD-MCNC: 16 GM/DL (ref 13–17.5)
LYMPHOCYTES # SPEC AUTO: 1.4 K/UL (ref 1–4.8)
LYMPHOCYTES NFR SPEC AUTO: 28 %
MCH RBC QN AUTO: 30.4 PG (ref 25–35)
MCHC RBC AUTO-ENTMCNC: 33.8 G/DL (ref 31–37)
MCV RBC AUTO: 90 FL (ref 80–100)
MONOCYTES # BLD AUTO: 0.3 K/UL (ref 0–1)
MONOCYTES NFR BLD AUTO: 6 %
NEUTROPHILS # BLD AUTO: 3.1 K/UL (ref 1.3–7.7)
NEUTROPHILS NFR BLD AUTO: 62 %
PH UR: 7 [PH] (ref 5–8)
PLATELET # BLD AUTO: 294 K/UL (ref 150–450)
POTASSIUM SERPL-SCNC: 4.3 MMOL/L (ref 3.5–5.1)
PROT SERPL-MCNC: 6.7 G/DL (ref 6.3–8.2)
SODIUM SERPL-SCNC: 140 MMOL/L (ref 137–145)
SP GR UR: 1.01 (ref 1–1.03)
UROBILINOGEN UR QL STRIP: <2 MG/DL (ref ?–2)
WBC # BLD AUTO: 5 K/UL (ref 3.8–10.6)

## 2021-07-23 PROCEDURE — 84443 ASSAY THYROID STIM HORMONE: CPT

## 2021-07-23 PROCEDURE — 99285 EMERGENCY DEPT VISIT HI MDM: CPT

## 2021-07-23 PROCEDURE — 36415 COLL VENOUS BLD VENIPUNCTURE: CPT

## 2021-07-23 PROCEDURE — 80053 COMPREHEN METABOLIC PANEL: CPT

## 2021-07-23 PROCEDURE — 81003 URINALYSIS AUTO W/O SCOPE: CPT

## 2021-07-23 PROCEDURE — 80306 DRUG TEST PRSMV INSTRMNT: CPT

## 2021-07-23 PROCEDURE — 96374 THER/PROPH/DIAG INJ IV PUSH: CPT

## 2021-07-23 PROCEDURE — 93005 ELECTROCARDIOGRAM TRACING: CPT

## 2021-07-23 PROCEDURE — 85025 COMPLETE CBC W/AUTO DIFF WBC: CPT

## 2021-07-23 PROCEDURE — 96361 HYDRATE IV INFUSION ADD-ON: CPT

## 2021-07-23 PROCEDURE — 84484 ASSAY OF TROPONIN QUANT: CPT

## 2021-07-23 PROCEDURE — 80320 DRUG SCREEN QUANTALCOHOLS: CPT

## 2021-07-23 NOTE — ED
Anxiety HPI





- General


Chief Complaint: Anxiety


Stated Complaint: anxiety


Time Seen by Provider: 07/23/21 10:24


Source: patient, EMS


Mode of arrival: EMS





- History of Present Illness


Initial Comments: 





Patient is a 55-year-old male presenting to the emergency department via EMS wit

h complaints of having a panic attack since 6 AM this morning.  Patient states 

he does have a history of anxiety, he did try 0.25 of his Xanax this morning but

it did not help.  He states he's been feeling worse over the past 1-2 weeks.  He

does see his PCP for his anxiety, to see a psychiatrist but does not anymore.  

He denies any suicide or homicidal thoughts.  He states his chest does feel franchesca

le tight but no sharp shooting chest pains, no radiation.  He denies any 

shortness of breath, some mild nausea, no vomiting.  He states this feels like 

his normal panic attacks which have been happening more frequently.  He denies 

any blurry vision, no dizziness.  He has no further complaints at this time.  

Upon arrival to the ER, his vitals are stable.





- Related Data


Home Medications: 


                                Home Medications











 Medication  Instructions  Recorded  Confirmed


 


ALPRAZolam [Xanax] 0.25 mg PO BID PRN 04/25/18 07/23/21


 


Baclofen 10 mg PO BID PRN 05/08/21 07/23/21


 


Rivaroxaban [Xarelto] 20 mg PO AC-SUPPER 05/08/21 07/23/21


 


Acetaminophen-Codeine 300-30mg 1 tab PO Q6H PRN 07/23/21 07/23/21





[Tylenol w/codeine #3]   











Allergies/Adverse Reactions: 


                                    Allergies











Allergy/AdvReac Type Severity Reaction Status Date / Time


 


No Known Allergies Allergy   Verified 07/23/21 11:43














Review of Systems


ROS Statement: 


Those systems with pertinent positive or pertinent negative responses have been 

documented in the HPI.





ROS Other: All systems not noted in ROS Statement are negative.





Past Medical History


Past Medical History: GERD/Reflux, Hyperlipidemia, Pulmonary Embolus (PE)


Additional Past Medical History / Comment(s): Hx pneumonia


History of Any Multi-Drug Resistant Organisms: None Reported


Past Surgical History: Cardiac Ablation, Heart Catheterization


Additional Past Surgical History / Comment(s): heart ablation


Past Anesthesia/Blood Transfusion Reactions: No Reported Reaction


Past Psychological History: Anxiety


Smoking Status: Never smoker


Past Alcohol Use History: None Reported, Occasional


Past Drug Use History: None Reported





- Past Family History


  ** Mother


Family Medical History: Coronary Artery Disease (CAD)


Additional Family Medical History / Comment(s): CABG in 70's,  one brother 

healthy no sisters no.daughter has no sons





  ** Father


Family Medical History: No Reported History





General Exam





- General Exam Comments


Initial Comments: 





GENERAL: 


Patient is well-developed and well-nourished.  Patient is nontoxic and in 

moderate distress, anxious.





HEAD: 


Atraumatic, normocephalic.





EYES:


Pupils equal round and reactive to light, extraocular movements intact, sclera 

anicteric, conjunctiva are normal.  Eyelids were unremarkable.





ENT: 


TMs normal, nares patent, oropharynx clear without exudates.  Moist mucous 

membranes.





NECK: 


Normal range of motion, supple without lymphadenopathy or JVD.





LUNGS:


Unlabored respirations.  Breath sounds clear to auscultation bilaterally and 

equal.  No wheezes rales or rhonchi.





HEART:


Tachycardia rate and rhythm without murmurs, rubs or gallops.





ABDOMEN: 


Soft, nontender, normoactive bowel sounds.  No guarding, no rebound.  No masses 

appreciated.





: Deferred 





MUSCULOSKELETAL: 


Normal extremities with adequate strength and normal range of motion, no pitting

or edema.  No clubbing or cyanosis.





NEUROLOGICAL: 


Patient is alert and oriented x 3.  Motor and sensory are also intact.  Cranial 

nerves II through XII grossly intact.  Symmetrical smile.  Normal speech, normal

gait.   





PSYCH:


Patient is anxious





SKIN:


 Warm, Dry, normal turgor, no rashes or lesions noted.


Limitations: no limitations





Course


                                   Vital Signs











  07/23/21 07/23/21





  10:22 14:41


 


Temperature 98.3 F 98.1 F


 


Pulse Rate 91 87


 


Respiratory 30 H 16





Rate  


 


Blood Pressure 120/75 127/85


 


O2 Sat by Pulse 98 97





Oximetry  














Medical Decision Making





- Medical Decision Making





Patient is a 55-year-old male with history of anxiety presenting via EMS for a 

panic attack started at 6 AM this morning.  He did try 0.25 of Xanax this 

morning without improvement.  His vitals are stable upon arrival.  Patient's 

labs are within normal limits, troponin is negative, urine is normal, serum 

alcohol and urine tox screen is negative except for the benzos which he is 

prescribed.  He was given a little fluids and 2 mg of Valium.  He reports 

improvement in his symptoms.  His vital signs remained stable.  Patient was 

concerned about going home because his anxiety seems to be worse than normal.  

Patient was evaluated by EPS, Domonique who will give him referral to [facility] and 

outpatient resources.  Patient is in agreement with this plan of care.  He is 

comfortable going home.  Return parameters were discussed with him and he 

verbalized understanding.  Case discussed with Dr. Peres.





- Lab Data


Result diagrams: 


                                 07/23/21 10:59





                                 07/23/21 10:59


                                   Lab Results











  07/23/21 07/23/21 07/23/21 Range/Units





  10:59 10:59 10:59 


 


WBC  5.0    (3.8-10.6)  k/uL


 


RBC  5.25    (4.30-5.90)  m/uL


 


Hgb  16.0    (13.0-17.5)  gm/dL


 


Hct  47.2    (39.0-53.0)  %


 


MCV  90.0    (80.0-100.0)  fL


 


MCH  30.4    (25.0-35.0)  pg


 


MCHC  33.8    (31.0-37.0)  g/dL


 


RDW  12.3    (11.5-15.5)  %


 


Plt Count  294    (150-450)  k/uL


 


MPV  7.2    


 


Neutrophils %  62    %


 


Lymphocytes %  28    %


 


Monocytes %  6    %


 


Eosinophils %  1    %


 


Basophils %  1    %


 


Neutrophils #  3.1    (1.3-7.7)  k/uL


 


Lymphocytes #  1.4    (1.0-4.8)  k/uL


 


Monocytes #  0.3    (0-1.0)  k/uL


 


Eosinophils #  0.0    (0-0.7)  k/uL


 


Basophils #  0.0    (0-0.2)  k/uL


 


Sodium    140  (137-145)  mmol/L


 


Potassium    4.3  (3.5-5.1)  mmol/L


 


Chloride    111 H  ()  mmol/L


 


Carbon Dioxide    21 L  (22-30)  mmol/L


 


Anion Gap    8  mmol/L


 


BUN    15  (9-20)  mg/dL


 


Creatinine    0.74  (0.66-1.25)  mg/dL


 


Est GFR (CKD-EPI)AfAm    >90  (>60 ml/min/1.73 sqM)  


 


Est GFR (CKD-EPI)NonAf    >90  (>60 ml/min/1.73 sqM)  


 


Glucose    107 H  (74-99)  mg/dL


 


Calcium    9.9  (8.4-10.2)  mg/dL


 


Total Bilirubin    0.8  (0.2-1.3)  mg/dL


 


AST    25  (17-59)  U/L


 


ALT    24  (4-49)  U/L


 


Alkaline Phosphatase    53  ()  U/L


 


Troponin I     (0.000-0.034)  ng/mL


 


Total Protein    6.7  (6.3-8.2)  g/dL


 


Albumin    4.4  (3.5-5.0)  g/dL


 


TSH    1.670  (0.465-4.680)  mIU/L


 


Urine Color   Light Yellow   


 


Urine Appearance   Clear   (Clear)  


 


Urine pH   7.0   (5.0-8.0)  


 


Ur Specific Gravity   1.007   (1.001-1.035)  


 


Urine Protein   Negative   (Negative)  


 


Urine Glucose (UA)   Negative   (Negative)  


 


Urine Ketones   Negative   (Negative)  


 


Urine Blood   Negative   (Negative)  


 


Urine Nitrite   Negative   (Negative)  


 


Urine Bilirubin   Negative   (Negative)  


 


Urine Urobilinogen   <2.0   (<2.0)  mg/dL


 


Ur Leukocyte Esterase   Negative   (Negative)  


 


Urine Opiates Screen   Not Detected   (NotDetected)  


 


Ur Oxycodone Screen   Not Detected   (NotDetected)  


 


Urine Methadone Screen   Not Detected   (NotDetected)  


 


Ur Propoxyphene Screen   Not Detected   (NotDetected)  


 


Ur Barbiturates Screen   Not Detected   (NotDetected)  


 


U Tricyclic Antidepress   Not Detected   (NotDetected)  


 


Ur Phencyclidine Scrn   Not Detected   (NotDetected)  


 


Ur Amphetamines Screen   Not Detected   (NotDetected)  


 


U Methamphetamines Scrn   Not Detected   (NotDetected)  


 


U Benzodiazepines Scrn   Detected H   (NotDetected)  


 


Urine Cocaine Screen   Not Detected   (NotDetected)  


 


U Marijuana (THC) Screen   Not Detected   (NotDetected)  


 


Serum Alcohol    <10  mg/dL














  07/23/21 Range/Units





  10:59 


 


WBC   (3.8-10.6)  k/uL


 


RBC   (4.30-5.90)  m/uL


 


Hgb   (13.0-17.5)  gm/dL


 


Hct   (39.0-53.0)  %


 


MCV   (80.0-100.0)  fL


 


MCH   (25.0-35.0)  pg


 


MCHC   (31.0-37.0)  g/dL


 


RDW   (11.5-15.5)  %


 


Plt Count   (150-450)  k/uL


 


MPV   


 


Neutrophils %   %


 


Lymphocytes %   %


 


Monocytes %   %


 


Eosinophils %   %


 


Basophils %   %


 


Neutrophils #   (1.3-7.7)  k/uL


 


Lymphocytes #   (1.0-4.8)  k/uL


 


Monocytes #   (0-1.0)  k/uL


 


Eosinophils #   (0-0.7)  k/uL


 


Basophils #   (0-0.2)  k/uL


 


Sodium   (137-145)  mmol/L


 


Potassium   (3.5-5.1)  mmol/L


 


Chloride   ()  mmol/L


 


Carbon Dioxide   (22-30)  mmol/L


 


Anion Gap   mmol/L


 


BUN   (9-20)  mg/dL


 


Creatinine   (0.66-1.25)  mg/dL


 


Est GFR (CKD-EPI)AfAm   (>60 ml/min/1.73 sqM)  


 


Est GFR (CKD-EPI)NonAf   (>60 ml/min/1.73 sqM)  


 


Glucose   (74-99)  mg/dL


 


Calcium   (8.4-10.2)  mg/dL


 


Total Bilirubin   (0.2-1.3)  mg/dL


 


AST   (17-59)  U/L


 


ALT   (4-49)  U/L


 


Alkaline Phosphatase   ()  U/L


 


Troponin I  <0.012  (0.000-0.034)  ng/mL


 


Total Protein   (6.3-8.2)  g/dL


 


Albumin   (3.5-5.0)  g/dL


 


TSH   (0.465-4.680)  mIU/L


 


Urine Color   


 


Urine Appearance   (Clear)  


 


Urine pH   (5.0-8.0)  


 


Ur Specific Gravity   (1.001-1.035)  


 


Urine Protein   (Negative)  


 


Urine Glucose (UA)   (Negative)  


 


Urine Ketones   (Negative)  


 


Urine Blood   (Negative)  


 


Urine Nitrite   (Negative)  


 


Urine Bilirubin   (Negative)  


 


Urine Urobilinogen   (<2.0)  mg/dL


 


Ur Leukocyte Esterase   (Negative)  


 


Urine Opiates Screen   (NotDetected)  


 


Ur Oxycodone Screen   (NotDetected)  


 


Urine Methadone Screen   (NotDetected)  


 


Ur Propoxyphene Screen   (NotDetected)  


 


Ur Barbiturates Screen   (NotDetected)  


 


U Tricyclic Antidepress   (NotDetected)  


 


Ur Phencyclidine Scrn   (NotDetected)  


 


Ur Amphetamines Screen   (NotDetected)  


 


U Methamphetamines Scrn   (NotDetected)  


 


U Benzodiazepines Scrn   (NotDetected)  


 


Urine Cocaine Screen   (NotDetected)  


 


U Marijuana (THC) Screen   (NotDetected)  


 


Serum Alcohol   mg/dL














- EKG Data


EKG Comments: 





Normal sinus rhythm, normal ECG, no signs of acute ST segment elevation.  This 

similar to previous on 05/08/2021.  Ventricular rate 76, TN interval 140, QTC 

402.





Disposition


Clinical Impression: 


 Acute anxiety, Mood disorder





Disposition: HOME SELF-CARE


Condition: Stable


Instructions (If sedation given, give patient instructions):  Generalized 

Anxiety Disorder (ED)


Additional Instructions: 


Please return to the Emergency Department if symptoms worsen or any other 

concerns.


Please follow up with [facility] as discussed.


Is patient prescribed a controlled substance at d/c from ED?: No


Referrals: 


Luis Felipe Bentley MD [Primary Care Provider] - 1-2 days


Time of Disposition: 14:31

## 2021-09-20 ENCOUNTER — HOSPITAL ENCOUNTER (EMERGENCY)
Dept: HOSPITAL 47 - EC | Age: 56
Discharge: HOME | End: 2021-09-20
Payer: COMMERCIAL

## 2021-09-20 VITALS
SYSTOLIC BLOOD PRESSURE: 133 MMHG | HEART RATE: 77 BPM | RESPIRATION RATE: 19 BRPM | DIASTOLIC BLOOD PRESSURE: 79 MMHG | TEMPERATURE: 97.8 F

## 2021-09-20 DIAGNOSIS — F41.9: Primary | ICD-10-CM

## 2021-09-20 DIAGNOSIS — K21.9: ICD-10-CM

## 2021-09-20 PROCEDURE — 82075 ASSAY OF BREATH ETHANOL: CPT

## 2021-09-20 PROCEDURE — 96372 THER/PROPH/DIAG INJ SC/IM: CPT

## 2021-09-20 PROCEDURE — 99283 EMERGENCY DEPT VISIT LOW MDM: CPT

## 2021-09-20 NOTE — ED
General Adult HPI





- General


Chief complaint: Psychiatric Symptoms


Stated complaint: EPS


Time Seen by Provider: 09/20/21 13:58


Source: patient, EMS


Mode of arrival: EMS


Limitations: no limitations





- History of Present Illness


Initial comments: 


Dictation was produced using dragon dictation software. please excuse any 

grammatical, word or spelling errors. 











Chief Complaint: 56-year-old male presents with anxiety reaction





History of Present Illness: Is 56-year-old male presents to emergency department

for one day of increased anxiety.  Denies any suicidal or homicidal ideation.  

Patient reports that he's been feeling anxious today.  He does get care by his 

prior care doctor [facility] to treat his anxiety.  Patient states 

he did not take his anxiety medicines today.  Denies any chest pain shortness of

breath.  He has no medical complaints at this time.  Arrived by EMS.








The ROS documented in this emergency department record has been reviewed and 

confirmed by me.  Those systems with pertinent positive or negative responses 

have been documented in the HPI.  All other systems are other negative and/or 

noncontributory.








PHYSICAL EXAM:


General Impression: Alert and oriented x3, not in acute distress


HEENT: Normocephalic atraumatic, extra-ocular movements intact, pupils equal and

reactive to light bilaterally, mucous membranes moist.


Cardiovascular: Heart regular rate and rhythm


Chest: Able to complete full sentences, no retractions, no tachypnea


Abdomen: abdomen soft, non-tender, non-distended, no organomegaly


Musculoskeletal: Pulses present and equal in all extremities, no peripheral 

edema


Motor:  no focal deficits noted


Neurological: CN II-XII grossly intact, no focal motor or sensory deficits noted


Skin: Intact with no visualized rashes


Psych: Normal affect and mood





ED course: 56-year-old well-appearing male presents emergency department for 

anxiety reaction.  Vital signs upon arrival are within acceptable limits.  He is

well-appearing at the bedside.  He does not appear anxious.  Does not show any 

signs of respiratory distress.  Denies any medical complaints at this time.  

Patient given intramuscular Ativan discharge advised to follow-up with his 

primary care doctor for outpatient management of anxiety symptoms.








EKG interpretation: Ventricular rate [default value]. No FL prolongation, no QTC

prolongation, no ST or T-wave changes noted.  EKG compared to [default value] 

showing no changes.  Overall, this EKG is unremarkable











- Related Data


                                Home Medications











 Medication  Instructions  Recorded  Confirmed


 


ALPRAZolam [Xanax] 0.25 mg PO BID PRN 04/25/18 07/23/21


 


Baclofen 10 mg PO BID PRN 05/08/21 07/23/21


 


Rivaroxaban [Xarelto] 20 mg PO AC-SUPPER 05/08/21 07/23/21


 


Acetaminophen-Codeine 300-30mg 1 tab PO Q6H PRN 07/23/21 07/23/21





[Tylenol w/codeine #3]   











                                    Allergies











Allergy/AdvReac Type Severity Reaction Status Date / Time


 


No Known Allergies Allergy   Verified 09/20/21 13:47














Review of Systems


ROS Statement: 


Those systems with pertinent positive or pertinent negative responses have been 

documented in the HPI.





ROS Other: All systems not noted in ROS Statement are negative.





Past Medical History


Past Medical History: GERD/Reflux, Hyperlipidemia, Pulmonary Embolus (PE)


Additional Past Medical History / Comment(s): Hx pneumonia


History of Any Multi-Drug Resistant Organisms: None Reported


Past Surgical History: Cardiac Ablation, Heart Catheterization


Additional Past Surgical History / Comment(s): heart ablation


Past Anesthesia/Blood Transfusion Reactions: No Reported Reaction


Past Psychological History: Anxiety


Smoking Status: Never smoker


Past Alcohol Use History: None Reported, Occasional


Past Drug Use History: None Reported





- Past Family History


  ** Mother


Family Medical History: Coronary Artery Disease (CAD)


Additional Family Medical History / Comment(s): CABG in 70's,  one brother 

healthy no sisters no.daughter has no sons





  ** Father


Family Medical History: No Reported History





General Exam


Limitations: no limitations





Course





                                   Vital Signs











  09/20/21





  13:44


 


Temperature 97.7 F


 


Pulse Rate 85


 


Respiratory 26 H





Rate 


 


Blood Pressure 123/76


 


O2 Sat by Pulse 98





Oximetry 














Disposition


Clinical Impression: 


 Acute anxiety





Disposition: HOME SELF-CARE


Condition: Good


Instructions (If sedation given, give patient instructions):  Anxiety (ED)


Is patient prescribed a controlled substance at d/c from ED?: No


Referrals: 


Luis Felipe Bentley MD [Primary Care Provider] - 1-2 days